# Patient Record
Sex: FEMALE | Race: WHITE | Employment: OTHER | URBAN - METROPOLITAN AREA
[De-identification: names, ages, dates, MRNs, and addresses within clinical notes are randomized per-mention and may not be internally consistent; named-entity substitution may affect disease eponyms.]

---

## 2017-03-27 DIAGNOSIS — N95.0 POSTMENOPAUSAL BLEEDING: ICD-10-CM

## 2017-03-27 NOTE — TELEPHONE ENCOUNTER
medroxyPROGESTERone (PROVERA) 5 MG tablet  Last Written Prescription Date: 4/8/16  Last Fill Quantity: 90, # refills: 3  Last Office Visit with [facility], [facility] or [facility] prescribing provider: 4/8/16       BP Readings from Last 3 Encounters:   04/08/16 124/74   11/21/14 146/90   11/07/14 158/77     Date of last Breast Exam: 10/7/14

## 2017-03-30 RX ORDER — MEDROXYPROGESTERONE ACETATE 5 MG
5 TABLET ORAL DAILY
Qty: 90 TABLET | Refills: 0 | Status: SHIPPED | OUTPATIENT
Start: 2017-03-30 | End: 2017-05-19

## 2017-03-30 NOTE — TELEPHONE ENCOUNTER
Medication is being filled for 1 time refill only due to:  Due for annual physical in April.   Please contact patient to schedule this.   Renuka Almodovar RN  Triage Nurse

## 2017-03-30 NOTE — TELEPHONE ENCOUNTER
Spoke with Pt and she is aware that she has to schedule, she has a lot going on in April with kids and family and is hoping to schedule in may.  Aixa MOHAMUD, STAS,CHLOE

## 2017-04-18 DIAGNOSIS — N95.1 SYMPTOMATIC MENOPAUSAL OR FEMALE CLIMACTERIC STATES: ICD-10-CM

## 2017-04-18 NOTE — TELEPHONE ENCOUNTER
cloNIDine (CATAPRES) 0.1 MG tablet        Last Written Prescription Date: 04/08/16  Last Fill Quantity: 90, # refills: 3  Last Office Visit with [facility], [facility] or [facility] prescribing provider: 04/08/16       Potassium   Date Value Ref Range Status   04/08/2016 4.2 3.4 - 5.3 mmol/L Final     Creatinine   Date Value Ref Range Status   04/08/2016 0.83 0.52 - 1.04 mg/dL Final     BP Readings from Last 3 Encounters:   04/08/16 124/74   11/21/14 146/90   11/07/14 158/77

## 2017-04-21 RX ORDER — CLONIDINE HYDROCHLORIDE 0.1 MG/1
0.1 TABLET ORAL AT BEDTIME
Qty: 60 TABLET | Refills: 0 | Status: SHIPPED | OUTPATIENT
Start: 2017-04-21 | End: 2017-05-19

## 2017-04-21 NOTE — TELEPHONE ENCOUNTER
Routing refill request to provider for review/approval because:  Drug not on the [facility] refill protocol   Renuka Almodovar, RN  Triage Nurse

## 2017-04-24 NOTE — TELEPHONE ENCOUNTER
LVM on mobile phone for patient to callback. Pt due for annual px last was 4/8/2016.    Thanks  Jalil MOLINA  Team Coodinator

## 2017-05-07 DIAGNOSIS — I10 ESSENTIAL HYPERTENSION, BENIGN: ICD-10-CM

## 2017-05-07 NOTE — TELEPHONE ENCOUNTER
triamterene-hydrochlorothiazide (DYAZIDE) 37.5-25 MG per capsule      Last Written Prescription Date: 4/18/2016  Last Fill Quantity: 90, # refills: 3  Last Office Visit with [facility], [facility] or [facility] prescribing provider: 4/18/2016  Next 5 appointments (look out 90 days)     May 19, 2017  8:50 AM CDT   PHYSICAL with Shena Alejo MD   [facility]    [address] [place] [id number] [phone]                   Potassium   Date Value Ref Range Status   04/08/2016 4.2 3.4 - 5.3 mmol/L Final     Creatinine   Date Value Ref Range Status   04/08/2016 0.83 0.52 - 1.04 mg/dL Final     BP Readings from Last 3 Encounters:   04/08/16 124/74   11/21/14 146/90   11/07/14 158/77

## 2017-05-09 RX ORDER — TRIAMTERENE AND HYDROCHLOROTHIAZIDE 37.5; 25 MG/1; MG/1
1 CAPSULE ORAL EVERY MORNING
Qty: 30 CAPSULE | Refills: 0 | Status: SHIPPED | OUTPATIENT
Start: 2017-05-09 | End: 2017-05-19

## 2017-05-09 NOTE — TELEPHONE ENCOUNTER
Medication is being filled for 1 time refill only due to:  Patient needs to be seen because it has been more than one year since last visit.     Patient has appt 5/19 with pcp.    Sharmila Estrada RN

## 2017-05-19 ENCOUNTER — RADIANT APPOINTMENT (OUTPATIENT)
Dept: BONE DENSITY | Facility: CLINIC | Age: 67
End: 2017-05-19
Payer: COMMERCIAL

## 2017-05-19 ENCOUNTER — OFFICE VISIT (OUTPATIENT)
Dept: PEDIATRICS | Facility: CLINIC | Age: 67
End: 2017-05-19
Payer: COMMERCIAL

## 2017-05-19 VITALS
DIASTOLIC BLOOD PRESSURE: 90 MMHG | OXYGEN SATURATION: 99 % | TEMPERATURE: 97.4 F | SYSTOLIC BLOOD PRESSURE: 156 MMHG | BODY MASS INDEX: 32.96 KG/M2 | WEIGHT: 186 LBS | HEIGHT: 63 IN | HEART RATE: 74 BPM

## 2017-05-19 DIAGNOSIS — E78.5 HYPERLIPIDEMIA LDL GOAL <160: ICD-10-CM

## 2017-05-19 DIAGNOSIS — Z23 NEED FOR PROPHYLACTIC VACCINATION AGAINST STREPTOCOCCUS PNEUMONIAE (PNEUMOCOCCUS): ICD-10-CM

## 2017-05-19 DIAGNOSIS — I10 ESSENTIAL HYPERTENSION, BENIGN: ICD-10-CM

## 2017-05-19 DIAGNOSIS — Z12.31 VISIT FOR SCREENING MAMMOGRAM: ICD-10-CM

## 2017-05-19 DIAGNOSIS — Z78.0 ASYMPTOMATIC POSTMENOPAUSAL STATUS: ICD-10-CM

## 2017-05-19 DIAGNOSIS — R73.01 IMPAIRED FASTING GLUCOSE: ICD-10-CM

## 2017-05-19 DIAGNOSIS — N95.0 POSTMENOPAUSAL BLEEDING: ICD-10-CM

## 2017-05-19 DIAGNOSIS — Z12.11 SCREEN FOR COLON CANCER: ICD-10-CM

## 2017-05-19 DIAGNOSIS — N95.1 SYMPTOMATIC MENOPAUSAL OR FEMALE CLIMACTERIC STATES: ICD-10-CM

## 2017-05-19 DIAGNOSIS — Z00.01 ENCOUNTER FOR ROUTINE ADULT PHYSICAL EXAM WITH ABNORMAL FINDINGS: Primary | ICD-10-CM

## 2017-05-19 PROCEDURE — 36415 COLL VENOUS BLD VENIPUNCTURE: CPT | Performed by: INTERNAL MEDICINE

## 2017-05-19 PROCEDURE — G0402 INITIAL PREVENTIVE EXAM: HCPCS | Performed by: INTERNAL MEDICINE

## 2017-05-19 PROCEDURE — 77080 DXA BONE DENSITY AXIAL: CPT | Performed by: FAMILY MEDICINE

## 2017-05-19 PROCEDURE — 90670 PCV13 VACCINE IM: CPT | Performed by: INTERNAL MEDICINE

## 2017-05-19 PROCEDURE — G0009 ADMIN PNEUMOCOCCAL VACCINE: HCPCS | Performed by: INTERNAL MEDICINE

## 2017-05-19 PROCEDURE — 80048 BASIC METABOLIC PNL TOTAL CA: CPT | Performed by: INTERNAL MEDICINE

## 2017-05-19 PROCEDURE — 77063 BREAST TOMOSYNTHESIS BI: CPT | Mod: TC

## 2017-05-19 PROCEDURE — G0202 SCR MAMMO BI INCL CAD: HCPCS | Mod: TC

## 2017-05-19 PROCEDURE — 80061 LIPID PANEL: CPT | Performed by: INTERNAL MEDICINE

## 2017-05-19 RX ORDER — MEDROXYPROGESTERONE ACETATE 5 MG
5 TABLET ORAL DAILY
Qty: 90 TABLET | Refills: 0 | Status: SHIPPED | OUTPATIENT
Start: 2017-05-19 | End: 2017-11-10

## 2017-05-19 RX ORDER — TRIAMTERENE AND HYDROCHLOROTHIAZIDE 37.5; 25 MG/1; MG/1
1 CAPSULE ORAL EVERY MORNING
Qty: 30 CAPSULE | Refills: 0 | Status: SHIPPED | OUTPATIENT
Start: 2017-05-19 | End: 2017-05-19

## 2017-05-19 RX ORDER — TRIAMTERENE AND HYDROCHLOROTHIAZIDE 37.5; 25 MG/1; MG/1
1 CAPSULE ORAL EVERY MORNING
Qty: 90 CAPSULE | Refills: 0 | Status: SHIPPED | OUTPATIENT
Start: 2017-05-19 | End: 2017-10-11

## 2017-05-19 RX ORDER — CLONIDINE HYDROCHLORIDE 0.1 MG/1
0.1 TABLET ORAL AT BEDTIME
Qty: 60 TABLET | Refills: 0 | Status: SHIPPED | OUTPATIENT
Start: 2017-05-19 | End: 2017-05-19

## 2017-05-19 RX ORDER — CLONIDINE HYDROCHLORIDE 0.1 MG/1
0.1 TABLET ORAL AT BEDTIME
Qty: 90 TABLET | Refills: 0 | Status: SHIPPED | OUTPATIENT
Start: 2017-05-19 | End: 2017-11-10

## 2017-05-19 NOTE — PROGRESS NOTES
SUBJECTIVE:                                                            Daria Driscoll is a 67 year old female who presents for Preventive Visit.  Are you in the first 12 months of your Medicare coverage?  Yes,  Visual Acuity:  Tested 1.5 wks ago at eye doctor and was 20/20    Physical   Annual:     Getting at least 3 servings of Calcium per day::  Yes    Bi-annual eye exam::  Yes    Dental care twice a year::  Yes    Sleep apnea or symptoms of sleep apnea::  None    Diet::  Regular (no restrictions)    Frequency of exercise::  2-3 days/week    Duration of exercise::  15-30 minutes    Taking medications regularly::  Yes    Medication side effects::  None    Additional concerns today::  No      COGNITIVE SCREEN  1) Repeat 3 items (Banana, Sunrise, Chair)    2) Clock draw: NORMAL  3) 3 item recall: Recalls 2 objects   Results: NORMAL clock, 1-2 items recalled: COGNITIVE IMPAIRMENT LESS LIKELY    Mini-CogTM Copyright CLAUDIA Carter. Licensed by the author for use in [facility]; reprinted with permission ([email or web address]@[email or web address]). All rights reserved.        Reviewed and updated as needed this visit by clinical staff  Tobacco  Allergies  Meds  Problems  Med Hx  Surg Hx  Fam Hx  Soc Hx          Reviewed and updated as needed this visit by Provider  Allergies  Meds  Problems        Social History   Substance Use Topics     Smoking status: Never Smoker     Smokeless tobacco: Never Used     Alcohol use No       The patient does not drink >3 drinks per day nor >7 drinks per week.        Today's PHQ-2 Score:   PHQ-2 ( 1999 Pfizer) 5/19/2017   Q1: Little interest or pleasure in doing things 0   Q2: Feeling down, depressed or hopeless 0   PHQ-2 Score 0   Little interest or pleasure in doing things Not at all   Feeling down, depressed or hopeless Not at all   PHQ-2 Score 0       Do you feel safe in your environment - Yes    Do you have a Health Care Directive?: Yes: Patient states has Advance Directive and will bring in a  "copy to clinic.  Kt, youngest son is  and would be decision maker.  Wants to be full code.  Do not keep alive if will be brain dead.  If chance of recovery - wants to be able to have some form of communication.    Current providers sharing in care for this patient include:   Patient Care Team:  Shena Alejo MD as PCP - General      Hearing impairment: Yes, Feel that people are mumbling or not speaking clearly occasionally.    Ability to successfully perform activities of daily living: Yes, no assistance needed     Fall risk:  Fallen 2 or more times in the past year?: No  Any fall with injury in the past year?: No    Home safety:  none identified      The following health maintenance items are reviewed in Epic and correct as of today:  Health Maintenance   Topic Date Due     FIT Q1 YR (NO INBASKET)  10/01/2015     MAMMO Q2 YR NO INBASKET MESSAGE  10/07/2016     FALL RISK ASSESSMENT  03/02/2017     DEXA SCAN SCREENING (SYSTEM ASSIGNED)  03/02/2017     PNEUMOCOCCAL (1 of 2 - PCV13) 03/02/2017     BMP Q1 YR (NO INBASKET)  04/08/2017     LIPID MONITORING Q1 YEAR( NO INBASKET)  04/08/2017     INFLUENZA VACCINE (SYSTEM ASSIGNED)  09/01/2017     PAP Q5 YEARS  10/07/2019     HPV Q5 YEARS (Complete with PAP)  10/07/2019     ADVANCE DIRECTIVE PLANNING Q5 YRS (NO INBASKET)  05/19/2022     TETANUS Q10 YR  02/12/2026     HEPATITIS C SCREENING  Completed          ROS:  Constitutional, HEENT, cardiovascular, pulmonary, GI, , musculoskeletal, neuro, skin, endocrine and psych systems are negative, except as otherwise noted.    Problem list, Medication list, Allergies, and Medical/Social/Surgical histories reviewed in [facility] and updated as appropriate.  Labs reviewed in EPIC  OBJECTIVE:                                                            /90 (BP Location: Right arm, Cuff Size: Adult Large)  Pulse 74  Temp 97.4  F (36.3  C) (Oral)  Ht 5' 2.5\" (1.588 m)  Wt 186 lb (84.4 kg)  SpO2 99%  BMI 33.48 " "kg/m2 Estimated body mass index is 33.48 kg/(m^2) as calculated from the following:    Height as of this encounter: 5' 2.5\" (1.588 m).    Weight as of this encounter: 186 lb (84.4 kg).  EXAM:   GENERAL: healthy, alert and no distress  EYES: Eyes grossly normal to inspection, PERRL and conjunctivae and sclerae normal  HENT: ear canals and TM's normal, nose and mouth without ulcers or lesions  NECK: no adenopathy, no asymmetry, masses, or scars and thyroid normal to palpation  RESP: lungs clear to auscultation - no rales, rhonchi or wheezes  BREAST: normal without masses, tenderness or nipple discharge and no palpable axillary masses or adenopathy  CV: regular rate and rhythm, normal S1 S2, no S3 or S4, no murmur, click or rub, no peripheral edema and peripheral pulses strong  ABDOMEN: soft, nontender, no hepatosplenomegaly, no masses and bowel sounds normal  MS: no gross musculoskeletal defects noted, no edema  SKIN: no suspicious lesions or rashes  NEURO: Normal strength and tone, mentation intact and speech normal  PSYCH: mentation appears normal, affect normal/bright    ASSESSMENT / PLAN:                                                            1. Encounter for routine adult physical exam with abnormal findings  Routine health education discussed: calcium, diet, exercise, weight, safety.     2. Impaired fasting glucose  Discussed glucose elevation.  Discuss this is a pre-diabetic condition.  Recommended eating healthily, exercising and maintaining a healthy weight to prevent the development of diabetes.  Recommended blood sugar checks at least yearly to monitor this.  Recommended dietary consultation which the patient declined.     3. Essential hypertension, benign  Uncontrolled.  Discussed healthy lifestyle  - BASIC METABOLIC PANEL  - triamterene-hydrochlorothiazide (DYAZIDE) 37.5-25 MG per capsule; Take 1 capsule by mouth every morning  Dispense: 90 capsule; Refill: 0    4. Hyperlipidemia LDL goal " "<160  recheck  - Lipid panel reflex to direct LDL    5. Symptomatic menopausal or female climacteric states  Controlld, refill  - cloNIDine (CATAPRES) 0.1 MG tablet; Take 1 tablet (0.1 mg) by mouth At Bedtime  Dispense: 90 tablet; Refill: 0    6. BENIGN HYPERTENSION  Uncontrolled, refill medication and consider adjustment if not better in 3 mos of lifestyle mod  - BASIC METABOLIC PANEL  - triamterene-hydrochlorothiazide (DYAZIDE) 37.5-25 MG per capsule; Take 1 capsule by mouth every morning  Dispense: 90 capsule; Refill: 0    7. Postmenopausal bleeding  Refill and will check with Dr. Gaxiola if needs to continue additional provera  - estradiol-norethindrone (COMBIPATCH) 0.05-0.14 MG/DAY BIW patch; Place 1 patch onto the skin twice a week  Dispense: 16 patch; Refill: 3  - medroxyPROGESTERone (PROVERA) 5 MG tablet; Take 1 tablet (5 mg) by mouth daily  Dispense: 90 tablet; Refill: 0    8. Need for prophylactic vaccination against Streptococcus pneumoniae (pneumococcus)    - Pneumococcal vaccine 13 valent PCV13 IM (Prevnar) [55763]    9. Asymptomatic postmenopausal status    - DEXA HIP/PELVIS/SPINE - Future; Future    10. Screen for colon cancer    - Fecal colorectal cancer screen FIT - Future (S+30); Future    11. Visit for screening mammogram  mammo      End of Life Planning:  Patient currently has an advanced directive: Yes.  Practitioner is supportive of decision.    COUNSELING:  Reviewed preventive health counseling, as reflected in patient instructions        Estimated body mass index is 33.48 kg/(m^2) as calculated from the following:    Height as of this encounter: 5' 2.5\" (1.588 m).    Weight as of this encounter: 186 lb (84.4 kg).  Weight management plan: Discussed healthy diet and exercise guidelines and patient will follow up in 12 months in clinic to re-evaluate.   reports that she has never smoked. She has never used smokeless tobacco.      Appropriate preventive services were discussed with this " patient, including applicable screening as appropriate for cardiovascular disease, diabetes, osteopenia/osteoporosis, and glaucoma.  As appropriate for age/gender, discussed screening for colorectal cancer, prostate cancer, breast cancer, and cervical cancer. Checklist reviewing preventive services available has been given to the patient.    Reviewed patients plan of care and provided an AVS. The Basic Care Plan (routine screening as documented in Health Maintenance) for Daria meets the Care Plan requirement. This Care Plan has been established and reviewed with the Patient.    Counseling Resources:  ATP IV Guidelines  Pooled Cohorts Equation Calculator  Breast Cancer Risk Calculator  FRAX Risk Assessment  ICSI Preventive Guidelines  Dietary Guidelines for Americans, 2010  USDA's MyPlate  ASA Prophylaxis  Lung CA Screening    Shena Alejo MD  [facility]

## 2017-05-19 NOTE — NURSING NOTE
"Chief Complaint   Patient presents with     Medicare Visit       Initial /90 (BP Location: Right arm, Cuff Size: Adult Large)  Pulse 74  Temp 97.4  F (36.3  C) (Oral)  Ht 5' 2.5\" (1.588 m)  Wt 186 lb (84.4 kg)  SpO2 99%  BMI 33.48 kg/m2 Estimated body mass index is 33.48 kg/(m^2) as calculated from the following:    Height as of this encounter: 5' 2.5\" (1.588 m).    Weight as of this encounter: 186 lb (84.4 kg).  Medication Reconciliation: complete   Cuca Earl MA    "

## 2017-05-19 NOTE — MR AVS SNAPSHOT
After Visit Summary   5/19/2017    Daria Driscoll    MRN: [id number]           Patient Information     Date Of Birth          1950        Visit Information        Provider Department      5/19/2017 8:50 AM Shena Alejo MD [facility]        Today's Diagnoses     Encounter for routine adult physical exam with abnormal findings    -  1    Impaired fasting glucose        Essential hypertension, benign        Hyperlipidemia LDL goal <160        Screen for colon cancer        Asymptomatic postmenopausal status        Visit for screening mammogram        Need for prophylactic vaccination against Streptococcus pneumoniae (pneumococcus)        Symptomatic menopausal or female climacteric states        BENIGN HYPERTENSION        Postmenopausal bleeding          Care Instructions      Preventive Health Recommendations    Female Ages 65 +    Yearly exam:     See your health care provider every year in order to  o Review health changes.   o Discuss preventive care.    o Review your medicines if your doctor has prescribed any.      You no longer need a yearly Pap test unless you've had an abnormal Pap test in the past 10 years. If you have vaginal symptoms, such as bleeding or discharge, be sure to talk with your provider about a Pap test.      Every 1 to 2 years, have a mammogram.  If you are over 69, talk with your health care provider about whether or not you want to continue having screening mammograms.      Every 10 years, have a colonoscopy. Or, have a yearly FIT test (stool test). These exams will check for colon cancer.       Have a cholesterol test every 5 years, or more often if your doctor advises it.       Have a diabetes test (fasting glucose) every three years. If you are at risk for diabetes, you should have this test more often.       At age 65, have a bone density scan (DEXA) to check for osteoporosis (brittle bone disease).    Shots:    Get a flu shot each year.    Get a tetanus  shot every 10 years.    Talk to your doctor about your pneumonia vaccines. There are now two you should receive - Pneumovax (PPSV 23) and Prevnar (PCV 13).    Talk to your doctor about the shingles vaccine.    Talk to your doctor about the hepatitis B vaccine.    Nutrition:     Eat at least 5 servings of fruits and vegetables each day.      Eat whole-grain bread, whole-wheat pasta and brown rice instead of white grains and rice.      Talk to your provider about Calcium and Vitamin D.     Lifestyle    Exercise at least 150 minutes a week (30 minutes a day, 5 days a week). This will help you control your weight and prevent disease.      Limit alcohol to one drink per day.      No smoking.       Wear sunscreen to prevent skin cancer.       See your dentist twice a year for an exam and cleaning.      See your eye doctor every 1 to 2 years to screen for conditions such as glaucoma, macular degeneration and cataracts.    Please mail in a copy of your living will for us to scan in and keep on file    Work on watching your diet and increasing your exercise and have your BP rechecked in 3 mos and let me know.  Eating Heart-Healthy Food: Using the DASH Plan    Eating for your heart doesn t have to be hard or boring. You just need to know how to make healthier choices. The DASH eating plan has been developed to help you do just that. DASH stands for Dietary Approaches to Stop Hypertension. It is a plan that has been proven to be healthier for your heart and to lower your risk for high blood pressure. It can also help lower your risk for cancer, heart disease, osteoporosis, and diabetes.  Choosing from each food group  Choose foods from each of the food groups below each day. Try to get the recommended number of servings for each food group. The serving numbers are based on a diet of 2,000 calories a day. Talk to your doctor if you re unsure about your calorie needs. Along with getting the correct servings, the DASH plan also  recommends a sodium intake less than 2,300 mg per day.        Grains  Servings: 6-8 a day  A serving is:    1 slice bread    1 ounce dry cereal    Half a cup cooked rice, pasta or cereal  Best choices: Whole grains and any grains high in fiber. Vegetables  Servings: 4-5 a day  A serving is:    1 cup raw leafy vegetable    Half a cup cut-up raw or cooked vegetable    Half a cup vegetable juice  Best choices: Fresh or frozen vegetables prepared without added salt or fat.   Fruits  Servings: 4-5 a day  A serving is:    1 medium fruit    One-quarter cup dried fruit    Half a cup fresh, frozen, or canned fruit    Half a cup of 100% fruit juics  Best choices: A variety of fresh fruits of different colors. Whole fruits are a better choice than fruit juices. Low-fat or fat-free dairy  Servings: 2-3 a day  A serving is:    1 cup milk    1 cup yogurt    One and a half ounces cheese  Best choices: Skim or 1% milk, low-fat or fat-free yogurt or buttermilk, and low-fat cheeses.         Lean meats, poultry, fish  Servings: 6 or fewer a day  A serving is:    1 ounce cooked meats, poultry, or fish    1 egg  Best choices: Lean poultry and fish. Trim away visible fat. Broil, grill, roast, or boil instead of frying. Remove skin from poultry before eating. Limit how much red meat you eat.  Nuts, seeds, beans  Servings: 4-5 a week  A serving is:    One-third cup nuts (one and a half ounces)    2 tablespoons nut butter or seeds    Half a cup cooked dry beans or legumes  Best choices:  Dry roasted  nuts with no salt added, lentils, kidney beans, garbanzo beans, and whole islas beans.   Fats and oils  Servings: 2-3 a day  A serving is:    1 teaspoon vegetable oil    1 teaspoon soft margarine    1 tablespoon mayonnaise    2 tablespoons salad dressing  Best choices: Nut and vegetable oils (nontropical vegetable oils), such as olive and canola oil. Sweets  Servings: 5 a week or fewer  A serving is:    1 tablespoon sugar, maple syrup, or  "honey    1 tablespoon jam or jelly    1 half-ounce jelly beans (about 15)    1 cup lemonade  Best choices: Dried fruit can be a satisfying sweet. Choose low-fat sweets. And watch your serving sizes!      For more on the DASH eating plan, visit:  www.nhlbi.nih.gov/health/health-topics/topics/dash     7687-2986 [employer]. [address] [place] [address], [place] [address]. All rights reserved. This information is not intended as a substitute for professional medical care. Always follow your healthcare professional's instructions.              Follow-ups after your visit        Future tests that were ordered for you today     Open Future Orders        Priority Expected Expires Ordered    DEXA HIP/PELVIS/SPINE - Future Routine  5/19/2018 5/19/2017    MA SCREENING DIGITAL BILAT - Future  (s+30) Routine  5/19/2018 5/19/2017    Fecal colorectal cancer screen FIT - Future (S+30) Routine 6/9/2017 6/18/2017 5/19/2017            Who to contact     If you have questions or need follow up information about today's clinic visit or your schedule please contact [facility] directly at [phone].  Normal or non-critical lab and imaging results will be communicated to you by MyChart, letter or phone within 4 business days after the clinic has received the results. If you do not hear from us within 7 days, please contact the clinic through [email or web address] or phone. If you have a critical or abnormal lab result, we will notify you by phone as soon as possible.  Submit refill requests through [employer] or call your pharmacy and they will forward the refill request to us. Please allow 3 business days for your refill to be completed.          Additional Information About Your Visit        MyChart Information     [employer]in lets you send messages to your doctor, view your test results, renew your prescriptions, schedule appointments and more. To sign up, go to [email or web address] . Click on \"Log in\" on the left side of the " "screen, which will take you to the Welcome page. Then click on \"Sign up Now\" on the right side of the page.     You will be asked to enter the access code listed below, as well as some personal information. Please follow the directions to create your username and password.     Your access code is: [email or web address]  Expires: 2017  9:31 AM     Your access code will  in 90 days. If you need help or a new code, please call your [place] clinic or [phone].        Care EveryWhere ID     This is your Care EveryWhere ID. This could be used by other organizations to access your [place] medical records  [id number]        Your Vitals Were     Pulse Temperature Height Pulse Oximetry BMI (Body Mass Index)       74 97.4  F (36.3  C) (Oral) 5' 2.5\" (1.588 m) 99% 33.48 kg/m2        Blood Pressure from Last 3 Encounters:   17 156/90   16 124/74   14 146/90    Weight from Last 3 Encounters:   17 186 lb (84.4 kg)   16 184 lb (83.5 kg)   14 194 lb 9.6 oz (88.3 kg)              We Performed the Following     BASIC METABOLIC PANEL     Full Code     Lipid panel reflex to direct LDL     Pneumococcal vaccine 13 valent PCV13 IM (Prevnar) [74999]          Today's Medication Changes          These changes are accurate as of: 17  9:35 AM.  If you have any questions, ask your nurse or doctor.               Start taking these medicines.        Dose/Directions    cloNIDine 0.1 MG tablet   Commonly known as:  CATAPRES   Used for:  Symptomatic menopausal or female climacteric states   Started by:  Shena Alejo MD        Dose:  0.1 mg   Take 1 tablet (0.1 mg) by mouth At Bedtime   Quantity:  90 tablet   Refills:  0       triamterene-hydrochlorothiazide 37.5-25 MG per capsule   Commonly known as:  DYAZIDE   Used for:  Essential hypertension, benign   Started by:  Shena Alejo MD        Dose:  1 capsule   Take 1 capsule by mouth every morning   Quantity:  90 capsule   Refills:  0          "   Where to get your medicines      These medications were sent to [facility]/pharmacy #[id number] - [place], [place] - [address] [facility]  [address] [facility] [place] [address]     Phone:  [phone]     cloNIDine 0.1 MG tablet    estradiol-norethindrone 0.05-0.14 MG/DAY BIW patch    medroxyPROGESTERone 5 MG tablet    triamterene-hydrochlorothiazide 37.5-25 MG per capsule                Primary Care Provider Office Phone # Fax #    Shena Alejo [phone]       [facility] [address] [place] [address]        Thank you!     Thank you for choosing [facility]  for your care. Our goal is always to provide you with excellent care. Hearing back from our patients is one way we can continue to improve our services. Please take a few minutes to complete the written survey that you may receive in the mail after your visit with us. Thank you!             Your Updated Medication List - Protect others around you: Learn how to safely use, store and throw away your medicines at www.disposemymeds.org.          This list is accurate as of: 5/19/17  9:35 AM.  Always use your most recent med list.                   Brand Name Dispense Instructions for use    aspirin  MG EC tablet     90 tablet    Take 325 mg by mouth daily Stopped 10 days prior to surg       calcium carb 1250 mg (500 mg [place])/vitamin D 200 units 500-200 MG-UNIT per tablet    OSCAL with D    100 tablet    Take 1 tablet by mouth 2 times daily (with meals).       cloNIDine 0.1 MG tablet    CATAPRES    90 tablet    Take 1 tablet (0.1 mg) by mouth At Bedtime       estradiol-norethindrone 0.05-0.14 MG/DAY BIW patch   Start taking on:  5/22/2017    COMBIPATCH    16 patch    Place 1 patch onto the skin twice a week       medroxyPROGESTERone 5 MG tablet    PROVERA    90 tablet    Take 1 tablet (5 mg) by mouth daily       multivitamin per tablet     100 tablet    Take 1 tablet by mouth daily.       red yeast rice 600 MG  Caps      Take 1,200 mg by mouth daily.       triamterene-hydrochlorothiazide 37.5-25 MG per capsule    DYAZIDE    90 capsule    Take 1 capsule by mouth every morning

## 2017-05-19 NOTE — LETTER
[address] [address]                  [phone]   May 22, 2017    Daria Driscoll  [address]  [facility] [address]      Dear Daria,    Here is a summary of your recent test results:    Your electrolytes, kidney function and blood sugar are normal.     Your 10-year ASCVD risk score (risk of heart attack or stroke in the next 10 years) is: 12.4%.  The goal is <10% and <7.5% is preferred.  Your risk is being driven by your high BP and your cholesterol.  Getting the top number of your blood pressure down to 130 will drop your risk to 8.8%.  Getting it to 120 will drop it to 7.5%.  I recall that you want to avoid cholesterol lowering medication, so I think this is your best option.  Please let me know how your BP is doing and we can adjust your medication.  Alternatively, you could control your risk by starting cholesterol lowering medication.  Let me know if you want to try that.     Please let us know if you have any questions or concerns.    Best regards,    Shena Alejo MD        Results for orders placed or performed in visit on 05/19/17   BASIC METABOLIC PANEL   Result Value Ref Range    Sodium 142 133 - 144 mmol/L    Potassium 3.8 3.4 - 5.3 mmol/L    Chloride 110 (H) 94 - 109 mmol/L    Carbon Dioxide 22 20 - 32 mmol/L    Anion Gap 10 3 - 14 mmol/L    Glucose 97 70 - 99 mg/dL    Urea Nitrogen 16 7 - 30 mg/dL    Creatinine 0.78 0.52 - 1.04 mg/dL    GFR Estimate 74 >60 mL/min/1.7m2    GFR Estimate If Black 89 >60 mL/min/1.7m2    Calcium 8.7 8.5 - 10.1 mg/dL   Lipid panel reflex to direct LDL   Result Value Ref Range    Cholesterol 204 (H) <200 mg/dL    Triglycerides 102 <150 mg/dL    HDL Cholesterol 41 (L) >49 mg/dL    LDL Cholesterol Calculated 143 (H) <100 mg/dL    Non HDL Cholesterol 163 (H) <130 mg/dL

## 2017-05-19 NOTE — PATIENT INSTRUCTIONS
Preventive Health Recommendations    Female Ages 65 +    Yearly exam:     See your health care provider every year in order to  o Review health changes.   o Discuss preventive care.    o Review your medicines if your doctor has prescribed any.      You no longer need a yearly Pap test unless you've had an abnormal Pap test in the past 10 years. If you have vaginal symptoms, such as bleeding or discharge, be sure to talk with your provider about a Pap test.      Every 1 to 2 years, have a mammogram.  If you are over 69, talk with your health care provider about whether or not you want to continue having screening mammograms.      Every 10 years, have a colonoscopy. Or, have a yearly FIT test (stool test). These exams will check for colon cancer.       Have a cholesterol test every 5 years, or more often if your doctor advises it.       Have a diabetes test (fasting glucose) every three years. If you are at risk for diabetes, you should have this test more often.       At age 65, have a bone density scan (DEXA) to check for osteoporosis (brittle bone disease).    Shots:    Get a flu shot each year.    Get a tetanus shot every 10 years.    Talk to your doctor about your pneumonia vaccines. There are now two you should receive - Pneumovax (PPSV 23) and Prevnar (PCV 13).    Talk to your doctor about the shingles vaccine.    Talk to your doctor about the hepatitis B vaccine.    Nutrition:     Eat at least 5 servings of fruits and vegetables each day.      Eat whole-grain bread, whole-wheat pasta and brown rice instead of white grains and rice.      Talk to your provider about Calcium and Vitamin D.     Lifestyle    Exercise at least 150 minutes a week (30 minutes a day, 5 days a week). This will help you control your weight and prevent disease.      Limit alcohol to one drink per day.      No smoking.       Wear sunscreen to prevent skin cancer.       See your dentist twice a year for an exam and cleaning.      See your  eye doctor every 1 to 2 years to screen for conditions such as glaucoma, macular degeneration and cataracts.    Please mail in a copy of your living will for us to scan in and keep on file    Work on watching your diet and increasing your exercise and have your BP rechecked in 3 mos and let me know.  Eating Heart-Healthy Food: Using the DASH Plan    Eating for your heart doesn t have to be hard or boring. You just need to know how to make healthier choices. The DASH eating plan has been developed to help you do just that. DASH stands for Dietary Approaches to Stop Hypertension. It is a plan that has been proven to be healthier for your heart and to lower your risk for high blood pressure. It can also help lower your risk for cancer, heart disease, osteoporosis, and diabetes.  Choosing from each food group  Choose foods from each of the food groups below each day. Try to get the recommended number of servings for each food group. The serving numbers are based on a diet of 2,000 calories a day. Talk to your doctor if you re unsure about your calorie needs. Along with getting the correct servings, the DASH plan also recommends a sodium intake less than 2,300 mg per day.        Grains  Servings: 6-8 a day  A serving is:    1 slice bread    1 ounce dry cereal    Half a cup cooked rice, pasta or cereal  Best choices: Whole grains and any grains high in fiber. Vegetables  Servings: 4-5 a day  A serving is:    1 cup raw leafy vegetable    Half a cup cut-up raw or cooked vegetable    Half a cup vegetable juice  Best choices: Fresh or frozen vegetables prepared without added salt or fat.   Fruits  Servings: 4-5 a day  A serving is:    1 medium fruit    One-quarter cup dried fruit    Half a cup fresh, frozen, or canned fruit    Half a cup of 100% fruit juics  Best choices: A variety of fresh fruits of different colors. Whole fruits are a better choice than fruit juices. Low-fat or fat-free dairy  Servings: 2-3 a day  A serving  is:    1 cup milk    1 cup yogurt    One and a half ounces cheese  Best choices: Skim or 1% milk, low-fat or fat-free yogurt or buttermilk, and low-fat cheeses.         Lean meats, poultry, fish  Servings: 6 or fewer a day  A serving is:    1 ounce cooked meats, poultry, or fish    1 egg  Best choices: Lean poultry and fish. Trim away visible fat. Broil, grill, roast, or boil instead of frying. Remove skin from poultry before eating. Limit how much red meat you eat.  Nuts, seeds, beans  Servings: 4-5 a week  A serving is:    One-third cup nuts (one and a half ounces)    2 tablespoons nut butter or seeds    Half a cup cooked dry beans or legumes  Best choices:  Dry roasted  nuts with no salt added, lentils, kidney beans, garbanzo beans, and whole islas beans.   Fats and oils  Servings: 2-3 a day  A serving is:    1 teaspoon vegetable oil    1 teaspoon soft margarine    1 tablespoon mayonnaise    2 tablespoons salad dressing  Best choices: Nut and vegetable oils (nontropical vegetable oils), such as olive and canola oil. Sweets  Servings: 5 a week or fewer  A serving is:    1 tablespoon sugar, maple syrup, or honey    1 tablespoon jam or jelly    1 half-ounce jelly beans (about 15)    1 cup lemonade  Best choices: Dried fruit can be a satisfying sweet. Choose low-fat sweets. And watch your serving sizes!      For more on the DASH eating plan, visit:  www.nhlbi.nih.gov/health/health-topics/topics/dash     2235-2167 [employer]. [address] [place] [address], [place] [address]. All rights reserved. This information is not intended as a substitute for professional medical care. Always follow your healthcare professional's instructions.

## 2017-05-19 NOTE — NURSING NOTE
"Chief Complaint   Patient presents with     Medicare Visit       Initial /90 (BP Location: Right arm, Cuff Size: Adult Large)  Pulse 74  Temp 97.4  F (36.3  C) (Oral)  Ht 5' 2.5\" (1.588 m)  Wt 186 lb (84.4 kg)  SpO2 99%  BMI 33.48 kg/m2 Estimated body mass index is 33.48 kg/(m^2) as calculated from the following:    Height as of this encounter: 5' 2.5\" (1.588 m).    Weight as of this encounter: 186 lb (84.4 kg).  Medication Reconciliation: complete   Screening Questionnaire for Adult Immunization    Are you sick today?   No   Do you have allergies to medications, food, a vaccine component or latex?   No   Have you ever had a serious reaction after receiving a vaccination?   No   Do you have a long-term health problem with heart disease, lung disease, asthma, kidney disease, metabolic disease (e.g. diabetes), anemia, or other blood disorder?   No   Do you have cancer, leukemia, HIV/AIDS, or any other immune system problem?   No   In the past 3 months, have you taken medications that affect  your immune system, such as prednisone, other steroids, or anticancer drugs; drugs for the treatment of rheumatoid arthritis, Crohn s disease, or psoriasis; or have you had radiation treatments?   No   Have you had a seizure, or a brain or other nervous system problem?   No   During the past year, have you received a transfusion of blood or blood     products, or been given immune (gamma) globulin or antiviral drug?   No   For women: Are you pregnant or is there a chance you could become        pregnant during the next month?   No   Have you received any vaccinations in the past 4 weeks?   No     Immunization questionnaire answers were all negative.      MNVFC doesn't apply on this patient    Per orders of Dr. Alejo, injection of PCV 13 given by Cuca Earl. Patient instructed to remain in clinic for 20 minutes afterwards, and to report any adverse reaction to me immediately.       Screening performed by Cuca HOLT" Mady on 5/19/2017 at 9:52 AM.    Cuca Earl MA

## 2017-05-20 LAB
ANION GAP SERPL CALCULATED.3IONS-SCNC: 10 MMOL/L (ref 3–14)
BUN SERPL-MCNC: 16 MG/DL (ref 7–30)
CALCIUM SERPL-MCNC: 8.7 MG/DL (ref 8.5–10.1)
CHLORIDE SERPL-SCNC: 110 MMOL/L (ref 94–109)
CHOLEST SERPL-MCNC: 204 MG/DL
CO2 SERPL-SCNC: 22 MMOL/L (ref 20–32)
CREAT SERPL-MCNC: 0.78 MG/DL (ref 0.52–1.04)
GFR SERPL CREATININE-BSD FRML MDRD: 74 ML/MIN/1.7M2
GLUCOSE SERPL-MCNC: 97 MG/DL (ref 70–99)
HDLC SERPL-MCNC: 41 MG/DL
LDLC SERPL CALC-MCNC: 143 MG/DL
NONHDLC SERPL-MCNC: 163 MG/DL
POTASSIUM SERPL-SCNC: 3.8 MMOL/L (ref 3.4–5.3)
SODIUM SERPL-SCNC: 142 MMOL/L (ref 133–144)
TRIGL SERPL-MCNC: 102 MG/DL

## 2017-05-21 ENCOUNTER — TELEPHONE (OUTPATIENT)
Dept: PEDIATRICS | Facility: CLINIC | Age: 67
End: 2017-05-21

## 2017-05-21 NOTE — TELEPHONE ENCOUNTER
Please call pt - Dr. Gaxiola said she could stop provera and just use patch.  She needs to notify us if vaginal bleeding develops.  Also remind her to be cheking BP ewekly and notify us of progress so can adjust medication if not controlled in 3 mos.

## 2017-05-22 NOTE — TELEPHONE ENCOUNTER
Called and spoke with patient. She had questions about stopping the provera and her hot flashes. She stated that if she ever missed a provera pill her hot flashes would come back and they get pretty intense.     She didn't seem to sure about stopping the provera without knowing how to manage the hot flashes.     Please advise.     Alexandra Rudd CMA ([facility])

## 2017-05-26 ENCOUNTER — TELEPHONE (OUTPATIENT)
Dept: NURSING | Facility: CLINIC | Age: 67
End: 2017-05-26

## 2017-05-26 NOTE — TELEPHONE ENCOUNTER
"Got MD's voice mail. States she'll stop the Provera. She wants to thank you for calling. She was getting mail from the mail box. She doesn't have questions at this time. She will call back if hot flashes worsen or if has complications. She thanks you for calling, \"it was very nice.\"  Candace Rodriguez RN-[facility]    "

## 2017-05-26 NOTE — TELEPHONE ENCOUNTER
Left message on cell.  provera doesn't help with hot flashes.try stopping and can always resume if has symptoms.

## 2017-06-07 DIAGNOSIS — I10 ESSENTIAL HYPERTENSION, BENIGN: ICD-10-CM

## 2017-06-07 RX ORDER — TRIAMTERENE AND HYDROCHLOROTHIAZIDE 37.5; 25 MG/1; MG/1
1 CAPSULE ORAL EVERY MORNING
Qty: 90 CAPSULE | Refills: 0 | Status: CANCELLED | OUTPATIENT
Start: 2017-06-07

## 2017-06-08 NOTE — TELEPHONE ENCOUNTER
triamterene-hydrochlorothiazide (DYAZIDE) 37.5-25 MG per capsule      Last Written Prescription Date: 5/19/2017  Last Fill Quantity: 90, # refills: 0  Last Office Visit with G, P or [facility] prescribing provider: 5/19/2017       Potassium   Date Value Ref Range Status   05/19/2017 3.8 3.4 - 5.3 mmol/L Final     Creatinine   Date Value Ref Range Status   05/19/2017 0.78 0.52 - 1.04 mg/dL Final     BP Readings from Last 3 Encounters:   05/19/17 156/90   04/08/16 124/74   11/21/14 146/90

## 2017-06-13 NOTE — TELEPHONE ENCOUNTER
Patient seen for physical in may, this is too soon for a refill.   May need follow up appointment.  Renuka Almodovar, RN  Triage Nurse

## 2017-10-06 ENCOUNTER — TELEPHONE (OUTPATIENT)
Dept: PEDIATRICS | Facility: CLINIC | Age: 67
End: 2017-10-06

## 2017-10-06 NOTE — LETTER
December 12, 2017      Daria Driscoll  [address]  [facility] [address]        Dear Daria,       We care about your health and have reviewed your health plan including your medical conditions, medications, and lab results.  Based on this review, it is recommended that you follow up regarding the following health topic(s):  -Fit Test    We recommend you take the following action(s):  Please Complete Fit Test. Please disregard if already completed         Thank you for trusting [facility] and we appreciate the opportunity to serve you.  We look forward to supporting your healthcare needs in the future.    Healthy Regards,    Your Health Care Team  [facility]      Sincerely,          Shena Alejo MD

## 2017-10-06 NOTE — TELEPHONE ENCOUNTER
Panel Management Review      Patient has the following on her problem list:     Hypertension   Last three blood pressure readings:  BP Readings from Last 3 Encounters:   05/19/17 156/90   04/08/16 124/74   11/21/14 146/90     Blood pressure: FAILED    HTN Guidelines:  Age 18-59 BP range:  Less than 140/90  Age 60-85 with Diabetes:  Less than 140/90  Age 60-85 without Diabetes:  less than 150/90        Composite cancer screening  Chart review shows that this patient is due/due soon for the following Fecal Colorectal (FIT)  Summary:    Patient is due/failing the following:   BP CHECK and FIT    Action needed:   Patient needs nurse only appointment and fit test    Type of outreach:    Phone, left message for patient to call back.     Questions for provider review:    None                                                                                                                                    Cindy Lock MA       Chart routed to self .

## 2017-10-11 DIAGNOSIS — I10 ESSENTIAL HYPERTENSION, BENIGN: ICD-10-CM

## 2017-10-11 NOTE — TELEPHONE ENCOUNTER
triamterene-hydrochlorothiazide (DYAZIDE) 37.5-25 MG per capsule      Last Written Prescription Date: 5/19/2017  Last Fill Quantity: 90, # refills: 0  Last Office Visit with G, P or [facility] prescribing provider: 5/19/2017       Potassium   Date Value Ref Range Status   05/19/2017 3.8 3.4 - 5.3 mmol/L Final     Creatinine   Date Value Ref Range Status   05/19/2017 0.78 0.52 - 1.04 mg/dL Final     BP Readings from Last 3 Encounters:   05/19/17 156/90   04/08/16 124/74   11/21/14 146/90

## 2017-10-12 RX ORDER — TRIAMTERENE AND HYDROCHLOROTHIAZIDE 37.5; 25 MG/1; MG/1
CAPSULE ORAL
Qty: 30 CAPSULE | Refills: 1 | Status: SHIPPED | OUTPATIENT
Start: 2017-10-12 | End: 2017-11-10

## 2017-10-12 NOTE — TELEPHONE ENCOUNTER
Routing refill request to provider for review/approval because:  BP elevated, due for follow up per last refill.  Break in medication?  Renuka Almodovar, RN  Triage Nurse

## 2017-11-08 NOTE — TELEPHONE ENCOUNTER
Panel Management Review      Patient has the following on her problem list:     Hypertension   Last three blood pressure readings:  BP Readings from Last 3 Encounters:   05/19/17 156/90   04/08/16 124/74   11/21/14 146/90     Blood pressure: failed    HTN Guidelines:  Age 18-59 BP range:  Less than 140/90  Age 60-85 with Diabetes:  Less than 140/90  Age 60-85 without Diabetes:  less than 150/90        Composite cancer screening  Chart review shows that this patient is due/due soon for the following Fecal Colorectal (FIT)  Summary:    Patient is due/failing the following:   BP CHECK and FIT    Action needed:   Patient needs referral/order: fit test and bp check    Type of outreach:    Phone, spoke to patient.  pt will be in 11/10 and will take home supplies for fit test    Questions for provider review:    None                                                                                                                                    Cindy Lock MA       Chart routed to self .

## 2017-11-10 ENCOUNTER — TELEPHONE (OUTPATIENT)
Dept: PEDIATRICS | Facility: CLINIC | Age: 67
End: 2017-11-10

## 2017-11-10 ENCOUNTER — OFFICE VISIT (OUTPATIENT)
Dept: PEDIATRICS | Facility: CLINIC | Age: 67
End: 2017-11-10
Payer: COMMERCIAL

## 2017-11-10 VITALS
BODY MASS INDEX: 32.87 KG/M2 | OXYGEN SATURATION: 99 % | DIASTOLIC BLOOD PRESSURE: 78 MMHG | HEIGHT: 63 IN | HEART RATE: 72 BPM | TEMPERATURE: 97.6 F | WEIGHT: 185.5 LBS | SYSTOLIC BLOOD PRESSURE: 136 MMHG

## 2017-11-10 DIAGNOSIS — Z12.11 SCREEN FOR COLON CANCER: ICD-10-CM

## 2017-11-10 DIAGNOSIS — N95.1 SYMPTOMATIC MENOPAUSAL OR FEMALE CLIMACTERIC STATES: ICD-10-CM

## 2017-11-10 DIAGNOSIS — I10 ESSENTIAL HYPERTENSION, BENIGN: Primary | ICD-10-CM

## 2017-11-10 DIAGNOSIS — Z23 NEED FOR PROPHYLACTIC VACCINATION AND INOCULATION AGAINST INFLUENZA: ICD-10-CM

## 2017-11-10 PROCEDURE — 90471 IMMUNIZATION ADMIN: CPT | Performed by: INTERNAL MEDICINE

## 2017-11-10 PROCEDURE — 90662 IIV NO PRSV INCREASED AG IM: CPT | Performed by: INTERNAL MEDICINE

## 2017-11-10 PROCEDURE — 99213 OFFICE O/P EST LOW 20 MIN: CPT | Mod: 25 | Performed by: INTERNAL MEDICINE

## 2017-11-10 RX ORDER — CLONIDINE HYDROCHLORIDE 0.1 MG/1
0.1 TABLET ORAL AT BEDTIME
Qty: 90 TABLET | Refills: 3 | Status: SHIPPED | OUTPATIENT
Start: 2017-11-10 | End: 2018-11-14

## 2017-11-10 RX ORDER — TRIAMTERENE AND HYDROCHLOROTHIAZIDE 37.5; 25 MG/1; MG/1
CAPSULE ORAL
Qty: 90 CAPSULE | Refills: 3 | Status: SHIPPED | OUTPATIENT
Start: 2017-11-10 | End: 2018-11-14

## 2017-11-10 NOTE — TELEPHONE ENCOUNTER
IGNACIO NOVACEMARCELLO (Key: [id number]) - [id number]  Prempro 0.3-1.5MG tablets  Status: PA Request  Created: November 10th, 2017 [phone]  Sent: November 10th, 2017  Open  Archive    Awaiting response.    Cindy Lock MA

## 2017-11-10 NOTE — PROGRESS NOTES
"  SUBJECTIVE:   Daria Driscoll is a 67 year old female who presents to clinic today for the following health issues:      Hypertension Follow-up      Outpatient blood pressures once daily but not the last 2 wks.      Low Salt Diet: 1 gram sodium    Menopause - still getting hor flashes.  Insurance does not want to pay for combipatch.  Waking at night and lasting an hour (hard to get back to sleep) although less frequent.      Amount of exercise or physical activity: 2-3 days/week for an average of 30-45 minutes    Problems taking medications regularly: No    Medication side effects: none    Diet: low salt      Problem list and histories reviewed & adjusted, as indicated.  Additional history: as documented    Labs reviewed in EPIC    Reviewed and updated as needed this visit by clinical staffTobacco  Allergies  Meds  Problems  Med Hx  Surg Hx  Fam Hx  Soc Hx        Reviewed and updated as needed this visit by Provider  Allergies  Meds  Problems         ROS:  Constitutional, cardiovascular, pulmonary, gi and gu systems are negative, except as otherwise noted.      OBJECTIVE:   /78 (BP Location: Right arm, Patient Position: Chair, Cuff Size: Adult Regular)  Pulse 72  Temp 97.6  F (36.4  C) (Oral)  Ht 5' 2.5\" (1.588 m)  Wt 185 lb 8 oz (84.1 kg)  SpO2 99%  BMI 33.39 kg/m2  Body mass index is 33.39 kg/(m^2).  GENERAL: healthy, alert and no distress  PSYCH: mentation appears normal, affect normal/bright        ASSESSMENT/PLAN:       1. Essential hypertension, benign  Controlled, refilled.  Discussed diet and exercise  - cloNIDine (CATAPRES) 0.1 MG tablet; Take 1 tablet (0.1 mg) by mouth At Bedtime  Dispense: 90 tablet; Refill: 3  - triamterene-hydrochlorothiazide (DYAZIDE) 37.5-25 MG per capsule; TAKE 1 CAPSULE BY MOUTH EVERY MORNING  Dispense: 90 capsule; Refill: 3    2. Symptomatic menopausal or female climacteric states  Per formulary, will change to oral rather than patch.  Notify if symptoms not " controlled  - cloNIDine (CATAPRES) 0.1 MG tablet; Take 1 tablet (0.1 mg) by mouth At Bedtime  Dispense: 90 tablet; Refill: 3  - estrogen, conjugated,-medroxyPROGESTERone (PREMPRO) 0.3-1.5 MG per tablet; Take 1 tablet by mouth daily  Dispense: 90 tablet; Refill: 3    3. Screen for colon cancer    - Fecal colorectal cancer screen (FIT); Future    4. Need for prophylactic vaccination and inoculation against influenza    - Vaccine Administration, Initial [16884]  - FLU VACCINE, INCREASED ANTIGEN, PRESV FREE, AGE 65+ [21573]    See Patient Instructions    Shena Alejo MD  [facility]

## 2017-11-10 NOTE — MR AVS SNAPSHOT
"              After Visit Summary   11/10/2017    Daria Driscoll    MRN: [id number]           Patient Information     Date Of Birth          1950        Visit Information        Provider Department      11/10/2017 8:30 AM Shena Alejo MD [facility]        Today's Diagnoses     Essential hypertension, benign    -  1    Symptomatic menopausal or female climacteric states        Screen for colon cancer          Care Instructions    Continue to exercise regularly and eat healthily.    Complete the stool test for blood.          Follow-ups after your visit        Follow-up notes from your care team     Return in about 6 months (around 5/10/2018) for Physical Exam.      Future tests that were ordered for you today     Open Future Orders        Priority Expected Expires Ordered    Fecal colorectal cancer screen (FIT) Routine 12/1/2017 2/2/2018 11/10/2017            Who to contact     If you have questions or need follow up information about today's clinic visit or your schedule please contact [facility] directly at [phone].  Normal or non-critical lab and imaging results will be communicated to you by [employer], letter or phone within 4 business days after the clinic has received the results. If you do not hear from us within 7 days, please contact the clinic through [employer] or phone. If you have a critical or abnormal lab result, we will notify you by phone as soon as possible.  Submit refill requests through [employer] or call your pharmacy and they will forward the refill request to us. Please allow 3 business days for your refill to be completed.          Additional Information About Your Visit        MyChart Information     [employer] lets you send messages to your doctor, view your test results, renew your prescriptions, schedule appointments and more. To sign up, go to [email or web address] . Click on \"Log in\" on the left side of the screen, which will take you to the Welcome page. " "Then click on \"Sign up Now\" on the right side of the page.     You will be asked to enter the access code listed below, as well as some personal information. Please follow the directions to create your username and password.     Your access code is: [email or web address]  Expires: 2018  8:59 AM     Your access code will  in 90 days. If you need help or a new code, please call your [facility] clinic or [phone].        Care EveryWhere ID     This is your Care EveryWhere ID. This could be used by other organizations to access your [facility] medical records  [id number]        Your Vitals Were     Pulse Temperature Height Pulse Oximetry BMI (Body Mass Index)       72 97.6  F (36.4  C) (Oral) 5' 2.5\" (1.588 m) 99% 33.39 kg/m2        Blood Pressure from Last 3 Encounters:   11/10/17 136/78   17 156/90   16 124/74    Weight from Last 3 Encounters:   11/10/17 185 lb 8 oz (84.1 kg)   17 186 lb (84.4 kg)   16 184 lb (83.5 kg)                 Today's Medication Changes          These changes are accurate as of: 11/10/17  8:59 AM.  If you have any questions, ask your nurse or doctor.               Start taking these medicines.        Dose/Directions    estrogen (conjugated)-medroxyPROGESTERone 0.3-1.5 MG per tablet   Commonly known as:  PREMPRO   Used for:  Symptomatic menopausal or female climacteric states   Started by:  Shena Alejo MD        Dose:  1 tablet   Take 1 tablet by mouth daily   Quantity:  90 tablet   Refills:  3         These medicines have changed or have updated prescriptions.        Dose/Directions    triamterene-hydrochlorothiazide 37.5-25 MG per capsule   Commonly known as:  DYAZIDE   This may have changed:  See the new instructions.   Used for:  Essential hypertension, benign   Changed by:  Shena Alejo MD        TAKE 1 CAPSULE BY MOUTH EVERY MORNING   Quantity:  90 capsule   Refills:  3            Where to get your medicines      These medications were sent to [facility]/pharmacy " #[id number] - [place], [place] - [address] [facility]  [address] [facility] [place] [address]     Phone:  [phone]     cloNIDine 0.1 MG tablet    estrogen (conjugated)-medroxyPROGESTERone 0.3-1.5 MG per tablet    triamterene-hydrochlorothiazide 37.5-25 MG per capsule                Primary Care Provider Office Phone # Fax #    Shena Alejo [phone] [address] [place] [address]        Equal Access to Services     [facility]: Hadii aad ku hadasho Soomaali, waaxda luqadaha, qaybta kaalmada adeannieyacarlitos, radha miller . So [facility] [phone].    ATENCIÓN: Si habla español, tiene a crocker disposición servicios gratuitos de asistencia lingüística. [facility] [phone].    We comply with applicable federal civil rights laws and [place] laws. We do not discriminate on the basis of race, color, national origin, age, disability, sex, sexual orientation, or gender identity.            Thank you!     Thank you for choosing [facility]  for your care. Our goal is always to provide you with excellent care. Hearing back from our patients is one way we can continue to improve our services. Please take a few minutes to complete the written survey that you may receive in the mail after your visit with us. Thank you!             Your Updated Medication List - Protect others around you: Learn how to safely use, store and throw away your medicines at www.disposemymeds.org.          This list is accurate as of: 11/10/17  8:59 AM.  Always use your most recent med list.                   Brand Name Dispense Instructions for use Diagnosis    aspirin  MG EC tablet     90 tablet    Take 325 mg by mouth daily Stopped 10 days prior to surg        Calcium carb-Vitamin D 500 mg Hoh-200 units 500-200 MG-UNIT per tablet    OSCAL with D;Oyster Shell Calcium    100 tablet    Take 1 tablet by mouth 2 times daily (with meals).        cloNIDine 0.1 MG tablet     CATAPRES    90 tablet    Take 1 tablet (0.1 mg) by mouth At Bedtime    Symptomatic menopausal or female climacteric states, Essential hypertension, benign       estradiol-norethindrone 0.05-0.14 MG/DAY BIW patch    COMBIPATCH    16 patch    Place 1 patch onto the skin twice a week    Postmenopausal bleeding       estrogen (conjugated)-medroxyPROGESTERone 0.3-1.5 MG per tablet    PREMPRO    90 tablet    Take 1 tablet by mouth daily    Symptomatic menopausal or female climacteric states       multivitamin per tablet     100 tablet    Take 1 tablet by mouth daily.        red yeast rice 600 MG Caps      Take 1,200 mg by mouth daily.        triamterene-hydrochlorothiazide 37.5-25 MG per capsule    DYAZIDE    90 capsule    TAKE 1 CAPSULE BY MOUTH EVERY MORNING    Essential hypertension, benign

## 2017-11-10 NOTE — NURSING NOTE
"Chief Complaint   Patient presents with     Hypertension       Initial /78 (BP Location: Right arm, Patient Position: Chair, Cuff Size: Adult Regular)  Pulse 72  Temp 97.6  F (36.4  C) (Oral)  Ht 5' 2.5\" (1.588 m)  Wt 185 lb 8 oz (84.1 kg)  SpO2 99%  BMI 33.39 kg/m2 Estimated body mass index is 33.39 kg/(m^2) as calculated from the following:    Height as of this encounter: 5' 2.5\" (1.588 m).    Weight as of this encounter: 185 lb 8 oz (84.1 kg).  Medication Reconciliation: complete   Cindy Lock MA    "

## 2017-11-10 NOTE — PROGRESS NOTES

## 2017-11-14 ENCOUNTER — TELEPHONE (OUTPATIENT)
Dept: PEDIATRICS | Facility: CLINIC | Age: 67
End: 2017-11-14

## 2017-11-14 PROBLEM — Z71.89 ACP (ADVANCE CARE PLANNING): Chronic | Status: ACTIVE | Noted: 2017-11-14

## 2017-12-12 NOTE — TELEPHONE ENCOUNTER
Panel Management Review      Patient has the following on her problem list:     Hypertension   Last three blood pressure readings:  BP Readings from Last 3 Encounters:   11/10/17 136/78   05/19/17 156/90   04/08/16 124/74     Blood pressure:     HTN Guidelines:  Age 18-59 BP range:  Less than 140/90  Age 60-85 with Diabetes:  Less than 140/90  Age 60-85 without Diabetes:  less than 150/90        Composite cancer screening  Chart review shows that this patient is due/due soon for the following Fecal Colorectal (FIT)  Summary:    Patient is due/failing the following:   FIT    Action needed:   Complete Fit test    Type of outreach:    Sent letter.    Questions for provider review:    None                                                                                                                                    Cindy Lock MA       Closing encounter 3rd attempt .

## 2018-11-08 DIAGNOSIS — I10 ESSENTIAL HYPERTENSION, BENIGN: ICD-10-CM

## 2018-11-08 RX ORDER — TRIAMTERENE AND HYDROCHLOROTHIAZIDE 37.5; 25 MG/1; MG/1
CAPSULE ORAL
Qty: 90 CAPSULE | Refills: 3 | OUTPATIENT
Start: 2018-11-08

## 2018-11-08 NOTE — TELEPHONE ENCOUNTER
Duplicate.     triamterene-hydrochlorothiazide (DYAZIDE) 37.5-25 MG per capsule was filled on 11/5/2018, qty 30 with 0 refills.

## 2018-12-11 DIAGNOSIS — N95.1 SYMPTOMATIC MENOPAUSAL OR FEMALE CLIMACTERIC STATES: ICD-10-CM

## 2018-12-11 RX ORDER — ESTROGEN,CON/M-PROGEST ACET 0.3-1.5MG
TABLET ORAL
Qty: 28 TABLET | Refills: 0 | Status: SHIPPED | OUTPATIENT
Start: 2018-12-11 | End: 2019-01-06

## 2018-12-11 NOTE — TELEPHONE ENCOUNTER
"Requested Prescriptions   Pending Prescriptions Disp Refills     PREMPRO 0.3-1.5 MG tablet [Pharmacy Med Name: PREMPRO 0.3 MG-1.5 MG TABLET]    Last Written Prescription Date:  11/10/2017  Last Fill Quantity: 90,  # refills: 3   Last office visit: 11/10/2017 with prescribing provider:  Shena Alejo     Future Office Visit:   Next 5 appointments (look out 90 days)    Jan 25, 2019  8:30 AM CST  PHYSICAL with Shena Alejo MD  [facility] ([facility]) [address] [place] [address]  [address]  [facility] [address]  [phone]          84 tablet 2     Sig: TAKE 1 TABLET BY MOUTH DAILY    Hormone Replacement Therapy Failed - 12/11/2018  2:24 AM       Failed - Blood pressure under 140/90 in past 12 months    BP Readings from Last 3 Encounters:   11/10/17 136/78   05/19/17 156/90   04/08/16 124/74                Failed - Recent (12 mo) or future (30 days) visit within the authorizing provider's specialty    Patient had office visit in the last 12 months or has a visit in the next 30 days with authorizing provider or within the authorizing provider's specialty.  See \"Patient Info\" tab in inbasket, or \"Choose Columns\" in Meds & Orders section of the refill encounter.             Passed - Patient has mammogram in past 2 years on file if age 50-75       Passed - Patient is 18 years of age or older       Passed - No active pregnancy on record       Passed - No positive pregnancy test on record in past 12 months              "

## 2018-12-11 NOTE — TELEPHONE ENCOUNTER
Appointment scheduled.   Next 5 appointments (look out 90 days)    Jan 25, 2019  8:30 AM CST  PHYSICAL with Shena Alejo MD  [facility] ([facility]) [address] [place] [address]  [address] [place] [address] [phone]        Medication is being filled for 1 time refill only due to:  due for appt Joanne Toro, RN  Message handled by Nurse Triage.

## 2018-12-13 DIAGNOSIS — I10 ESSENTIAL HYPERTENSION, BENIGN: ICD-10-CM

## 2018-12-13 NOTE — TELEPHONE ENCOUNTER
Not due for a refill.     triamterene-hydrochlorothiazide (DYAZIDE) 37.5-25 MG per capsule was filled on 11/14/2018, qty 90 with 0 refills.

## 2018-12-17 RX ORDER — TRIAMTERENE AND HYDROCHLOROTHIAZIDE 37.5; 25 MG/1; MG/1
CAPSULE ORAL
Qty: 90 CAPSULE | Refills: 0 | OUTPATIENT
Start: 2018-12-17

## 2019-01-06 DIAGNOSIS — N95.1 SYMPTOMATIC MENOPAUSAL OR FEMALE CLIMACTERIC STATES: ICD-10-CM

## 2019-01-07 NOTE — TELEPHONE ENCOUNTER
"Requested Prescriptions   Pending Prescriptions Disp Refills     PREMPRO 0.3-1.5 MG tablet [Pharmacy Med Name: PREMPRO 0.3 MG-1.5 MG TABLET]    Last Written Prescription Date:  12/11/2018  Last Fill Quantity: 28,  # refills: 0   Last office visit: 11/10/2017 with prescribing provider:  Shena Alejo     Future Office Visit:   Next 5 appointments (look out 90 days)    Jan 25, 2019  8:30 AM CST  PHYSICAL with Shena Alejo MD  [facility] ([facility]) [address] [place] [address]  [address]  [facility] [address]  [phone]          28 tablet 0     Sig: TAKE 1 TABLET BY MOUTH EVERY DAY    Hormone Replacement Therapy Failed - 1/6/2019 12:52 AM       Failed - Blood pressure under 140/90 in past 12 months    BP Readings from Last 3 Encounters:   11/10/17 136/78   05/19/17 156/90   04/08/16 124/74                Passed - Recent (12 mo) or future (30 days) visit within the authorizing provider's specialty    Patient had office visit in the last 12 months or has a visit in the next 30 days with authorizing provider or within the authorizing provider's specialty.  See \"Patient Info\" tab in inbasket, or \"Choose Columns\" in Meds & Orders section of the refill encounter.             Passed - Patient has mammogram in past 2 years on file if age 50-75       Passed - Medication is active on med list       Passed - Patient is 18 years of age or older       Passed - No active pregnancy on record       Passed - No positive pregnancy test on record in past 12 months          "

## 2019-01-09 RX ORDER — ESTROGEN,CON/M-PROGEST ACET 0.3-1.5MG
TABLET ORAL
Qty: 28 TABLET | Refills: 0 | Status: SHIPPED | OUTPATIENT
Start: 2019-01-09 | End: 2019-02-12

## 2019-01-09 NOTE — TELEPHONE ENCOUNTER
Routing refill request to provider for review/approval because:  Lola given x1, appointment is scheduled. Failed RN Refill Protocol.    Nanda Norwood RN

## 2019-01-31 ENCOUNTER — TELEPHONE (OUTPATIENT)
Dept: PEDIATRICS | Facility: CLINIC | Age: 69
End: 2019-01-31

## 2019-01-31 NOTE — TELEPHONE ENCOUNTER
Panel Management Review          Composite cancer screening  Chart review shows that this patient is due/due soon for the following Fecal Colorectal (FIT)  Summary:    Patient is due/failing the following:   FIT    Action needed:   Patient needs non-fasting lab only appointment    Type of outreach:    Sent letter.    Questions for provider review:    None                                                                                                                                    Juliana Decker CMA([facility])

## 2019-01-31 NOTE — LETTER
January 31, 2019      Daria Driscoll  [address]  [facility] [address]        Dear Daria,       We care about your health and have reviewed your health plan including your medical conditions, medications, and lab results.  Based on this review, it is recommended that you follow up regarding the following health topic(s):  -Colon Cancer Screening    We recommend you take the following action(s):  -Call the clinic to arrange to  a FIT test (test for hidden blood in the stool) to complete at home and return to the clinic.     Please call us at the [facility] - [phone] (or use [employer]) to address the above recommendations.     Thank you for trusting [facility] and we appreciate the opportunity to serve you.  We look forward to supporting your healthcare needs in the future.    Healthy Regards,    Your Health Care Team  [facility]

## 2019-02-12 DIAGNOSIS — N95.1 SYMPTOMATIC MENOPAUSAL OR FEMALE CLIMACTERIC STATES: ICD-10-CM

## 2019-02-12 NOTE — TELEPHONE ENCOUNTER
"Requested Prescriptions   Pending Prescriptions Disp Refills     estrogen conj-medroxyPROGESTERone (PREMPRO) 0.3-1.5 MG tablet  Last Written Prescription Date:  01/09/2019  Last Fill Quantity: 28 tablet,  # refills: 0   Last office visit: 11/10/2017 with prescribing provider:  Shena Alejo MD   Future Office Visit:     28 tablet 0     Sig: Take 1 tablet by mouth daily    Hormone Replacement Therapy Failed - 2/12/2019  9:50 AM       Failed - Blood pressure under 140/90 in past 12 months    BP Readings from Last 3 Encounters:   11/10/17 136/78   05/19/17 156/90   04/08/16 124/74                Failed - Recent (12 mo) or future (30 days) visit within the authorizing provider's specialty    Patient had office visit in the last 12 months or has a visit in the next 30 days with authorizing provider or within the authorizing provider's specialty.  See \"Patient Info\" tab in inbasket, or \"Choose Columns\" in Meds & Orders section of the refill encounter.             Passed - Patient has mammogram in past 2 years on file if age 50-75       Passed - Medication is active on med list       Passed - Patient is 18 years of age or older       Passed - No active pregnancy on record       Passed - No positive pregnancy test on record in past 12 months          "

## 2019-02-12 NOTE — TELEPHONE ENCOUNTER
Routing refill request to provider for review/approval because:  Lola given x2 and patient did not follow up, please advise  Labs not current:  BP  Patient needs to be seen because it has been more than 1 year since last office visit.  Maribeth WILLETT RN - Triage  [facility]

## 2019-02-24 DIAGNOSIS — I10 ESSENTIAL HYPERTENSION, BENIGN: ICD-10-CM

## 2019-02-24 NOTE — TELEPHONE ENCOUNTER
"Requested Prescriptions   Pending Prescriptions Disp Refills     triamterene-HCTZ (DYAZIDE) 37.5-25 MG capsule  Last Written Prescription Date:  11/14/2018  Last Fill Quantity: 90 capsule,  # refills: 0   Last office visit: 11/10/2017 with prescribing provider:  Shena Alejo MD    Future Office Visit:   Next 5 appointments (look out 90 days)    May 17, 2019  8:30 AM CDT  PHYSICAL with Shena Alejo MD  [facility] ([facility]) [address] [place] [address]  [address]  [facility] [address] [phone]          90 capsule 0     Sig: TAKE 1 CAPSULE BY MOUTH EVERY MORNING    Diuretics (Including Combos) Protocol Failed - 2/24/2019 11:28 AM       Failed - Blood pressure under 140/90 in past 12 months    BP Readings from Last 3 Encounters:   11/10/17 136/78   05/19/17 156/90   04/08/16 124/74                Failed - Recent (12 mo) or future (30 days) visit within the authorizing provider's specialty    Patient had office visit in the last 12 months or has a visit in the next 30 days with authorizing provider or within the authorizing provider's specialty.  See \"Patient Info\" tab in inbasket, or \"Choose Columns\" in Meds & Orders section of the refill encounter.             Failed - Normal serum creatinine on file in past 12 months    Recent Labs   Lab Test 05/19/17  1036   CR 0.78             Failed - Normal serum potassium on file in past 12 months    Recent Labs   Lab Test 05/19/17  1036   POTASSIUM 3.8                   Failed - Normal serum sodium on file in past 12 months    Recent Labs   Lab Test 05/19/17  1036                Passed - Medication is active on med list       Passed - Patient is age 18 or older       Passed - No active pregancy on record       Passed - No positive pregnancy test in past 12 months          "

## 2019-02-27 RX ORDER — TRIAMTERENE AND HYDROCHLOROTHIAZIDE 37.5; 25 MG/1; MG/1
CAPSULE ORAL
Qty: 90 CAPSULE | Refills: 0 | Status: SHIPPED | OUTPATIENT
Start: 2019-02-27 | End: 2019-05-22

## 2019-02-27 NOTE — TELEPHONE ENCOUNTER
Please call patient to make earlier appointment.  I know she has a long drive, but that May visit will make her very overdue.  She has rescheduled 3 times now.  Her last labs were 5/17.  Even April would be better.    Shena Alejo MD

## 2019-02-27 NOTE — TELEPHONE ENCOUNTER
Overdue for physical, LOV was on 11/10/17. Labs are very overdue as well.     Routing to station to call the Pt and schedule an OV.     Ok to route to the provider once the Pt is scheduled.     Yvette Knox RN -- [facility]

## 2019-02-27 NOTE — TELEPHONE ENCOUNTER
Spoke with pt and states she cannot come any sooner I offered same days to fit her in sooner but declined states if she changes her mind she will call back. Routing to PCP    Cindy Lock MA

## 2019-04-24 DIAGNOSIS — I10 ESSENTIAL HYPERTENSION, BENIGN: ICD-10-CM

## 2019-04-24 DIAGNOSIS — N95.1 SYMPTOMATIC MENOPAUSAL OR FEMALE CLIMACTERIC STATES: ICD-10-CM

## 2019-04-24 NOTE — TELEPHONE ENCOUNTER
"Requested Prescriptions   Pending Prescriptions Disp Refills     cloNIDine (CATAPRES) 0.1 MG tablet [Pharmacy Med Name: CLONIDINE HCL 0.1 MG TABLET]  Last Written Prescription Date:  11/14/2018  Last Fill Quantity: 90 tablet,  # refills: 0    Last office visit: 11/10/2017 with prescribing provider:  Shena Alejo MD        Future Office Visit:   Next 5 appointments (look out 90 days)    May 31, 2019  1:50 PM CDT  PHYSICAL with Shena Alejo MD  [facility] ([facility]) [address] [place] [address]  [address]  [facility] [address]  [phone]          90 tablet 0     Sig: TAKE 1 TABLET (0.1 MG) BY MOUTH AT BEDTIME       Central Acting Antiadrenergic Agents Failed - 4/24/2019  9:16 AM        Failed - Blood pressure under 140/90 in past 12 months     BP Readings from Last 3 Encounters:   11/10/17 136/78   05/19/17 156/90   04/08/16 124/74                 Failed - Recent (12 mo) or future (30 days) visit within the authorizing provider's specialty     Patient had office visit in the last 12 months or has a visit in the next 30 days with authorizing provider or within the authorizing provider's specialty.  See \"Patient Info\" tab in inbasket, or \"Choose Columns\" in Meds & Orders section of the refill encounter.              Failed - Normal serum creatinine on file within past 12 months     Recent Labs   Lab Test 05/19/17  1036   CR 0.78             Passed - Patient is 6 years of age or older        Passed - Medication is active on med list        Passed - Patient not pregnant        Passed - No positive pregnancy test on file in past 12 months          "

## 2019-04-26 RX ORDER — CLONIDINE HYDROCHLORIDE 0.1 MG/1
0.1 TABLET ORAL AT BEDTIME
Qty: 30 TABLET | Refills: 0 | Status: SHIPPED | OUTPATIENT
Start: 2019-04-26 | End: 2019-05-17

## 2019-04-26 NOTE — TELEPHONE ENCOUNTER
Routing refill request to provider for review/approval because:  Labs out of range:  BP  Labs not current:  CR  A break in medication: received a 90 day on 11/14/18  Patient needs to be seen because:  Patient has not been seen since 11/10/2017.     T'd up with a 30 day supply.     Please review and advise on refill.     Diamond Rolon RN Flex

## 2019-05-07 DIAGNOSIS — N95.1 SYMPTOMATIC MENOPAUSAL OR FEMALE CLIMACTERIC STATES: ICD-10-CM

## 2019-05-07 NOTE — TELEPHONE ENCOUNTER
"Requested Prescriptions   Pending Prescriptions Disp Refills     PREMPRO 0.3-1.5 MG tablet [Pharmacy Med Name: PREMPRO 0.3 MG-1.5 MG TABLET]  Last Written Prescription Date:  2/12/19  Last Fill Quantity: 90,  # refills: 0    Last office visit: 11/10/2017 with prescribing provider:  Shena Alejo MD        Future Office Visit:   Next 5 appointments (look out 90 days)    May 31, 2019  1:50 PM CDT  PHYSICAL with Shena Alejo MD  [facility] ([facility]) [address] [place] [address]  [address]  [facility] [id number] [phone]          84 tablet 0     Sig: TAKE 1 TABLET BY MOUTH EVERY DAY       Hormone Replacement Therapy Failed - 5/7/2019  2:35 PM        Failed - Blood pressure under 140/90 in past 12 months     BP Readings from Last 3 Encounters:   11/10/17 136/78   05/19/17 156/90   04/08/16 124/74           Passed - Recent (12 mo) or future (30 days) visit within the authorizing provider's specialty     Patient had office visit in the last 12 months or has a visit in the next 30 days with authorizing provider or within the authorizing provider's specialty.  See \"Patient Info\" tab in inbasket, or \"Choose Columns\" in Meds & Orders section of the refill encounter.            Passed - Patient has mammogram in past 2 years on file if age 50-75        Passed - Medication is active on med list        Passed - Patient is 18 years of age or older        Passed - No active pregnancy on record        Passed - No positive pregnancy test on record in past 12 months      Routing refill request to provider for review/approval because:  Labs not current:  BP  Patient needs to be seen because it has been more than 1 year since last office visit.    María Sky RN          "

## 2019-05-09 RX ORDER — ESTROGEN,CON/M-PROGEST ACET 0.3-1.5MG
TABLET ORAL
Qty: 30 TABLET | Refills: 0 | Status: SHIPPED | OUTPATIENT
Start: 2019-05-09 | End: 2019-06-21

## 2019-05-22 DIAGNOSIS — I10 ESSENTIAL HYPERTENSION, BENIGN: ICD-10-CM

## 2019-05-22 NOTE — TELEPHONE ENCOUNTER
"Requested Prescriptions   Pending Prescriptions Disp Refills     triamterene-HCTZ (DYAZIDE) 37.5-25 MG capsule [Pharmacy Med Name: TRIAMTERENE-HCTZ 37.5-25 MG CP] 90 capsule 0     Sig: TAKE 1 CAPSULE BY MOUTH EVERY DAY IN THE MORNING  Last Written Prescription Date:  02/27/2019  Last Fill Quantity: 90 capsule,  # refills: 0    Last office visit:11/10/2017 :  Shena Alejo MD       Future Office Visit:           Diuretics (Including Combos) Protocol Failed - 5/22/2019 11:39 AM        Failed - Blood pressure under 140/90 in past 12 months     BP Readings from Last 3 Encounters:   11/10/17 136/78   05/19/17 156/90   04/08/16 124/74             Failed - Recent (12 mo) or future (30 days) visit within the authorizing provider's specialty     Patient had office visit in the last 12 months or has a visit in the next 30 days with authorizing provider or within the authorizing provider's specialty.  See \"Patient Info\" tab in inbasket, or \"Choose Columns\" in Meds & Orders section of the refill encounter.            Failed - Normal serum creatinine on file in past 12 months     Recent Labs   Lab Test 05/19/17  1036   CR 0.78              Failed - Normal serum potassium on file in past 12 months     Recent Labs   Lab Test 05/19/17  1036   POTASSIUM 3.8                    Failed - Normal serum sodium on file in past 12 months     Recent Labs   Lab Test 05/19/17  1036                 Passed - Medication is active on med list        Passed - Patient is age 18 or older        Passed - No active pregancy on record        Passed - No positive pregnancy test in past 12 months      Routing refill request to provider for review/approval because:  Labs not current:  NA, Potassium, CR, BP  Patient needs to be seen because it has been more than 1 year since last office visit.    María Sky RN            "

## 2019-05-23 RX ORDER — TRIAMTERENE AND HYDROCHLOROTHIAZIDE 37.5; 25 MG/1; MG/1
CAPSULE ORAL
Qty: 90 CAPSULE | Refills: 0 | Status: SHIPPED | OUTPATIENT
Start: 2019-05-23 | End: 2019-06-21

## 2019-05-23 NOTE — TELEPHONE ENCOUNTER
Pt has cancelled many appts and Dr. Alejo made it clear that she needed to be seen. However, her last 2 appts were cancelled by us, so I refilled 90 more days. Please let pt know she needs appt here or elsewhere and no fills will be given after that for any of her meds.

## 2019-05-23 NOTE — TELEPHONE ENCOUNTER
Patient has appointment scheduled for 6/21 with Dr. Alejo that at this time, is not cancelled. Closing out encounter.     Chelsea Edward CMA on 4/23/2019 at 9:32 AM

## 2019-06-21 ENCOUNTER — ANCILLARY PROCEDURE (OUTPATIENT)
Dept: MAMMOGRAPHY | Facility: CLINIC | Age: 69
End: 2019-06-21
Attending: INTERNAL MEDICINE
Payer: COMMERCIAL

## 2019-06-21 ENCOUNTER — OFFICE VISIT (OUTPATIENT)
Dept: PEDIATRICS | Facility: CLINIC | Age: 69
End: 2019-06-21
Payer: COMMERCIAL

## 2019-06-21 VITALS
HEIGHT: 63 IN | DIASTOLIC BLOOD PRESSURE: 76 MMHG | SYSTOLIC BLOOD PRESSURE: 118 MMHG | TEMPERATURE: 97.6 F | HEART RATE: 74 BPM | BODY MASS INDEX: 33.73 KG/M2 | WEIGHT: 190.4 LBS | OXYGEN SATURATION: 97 %

## 2019-06-21 DIAGNOSIS — N95.1 SYMPTOMATIC MENOPAUSAL OR FEMALE CLIMACTERIC STATES: ICD-10-CM

## 2019-06-21 DIAGNOSIS — R73.01 IMPAIRED FASTING GLUCOSE: ICD-10-CM

## 2019-06-21 DIAGNOSIS — Z13.220 SCREENING FOR LIPID DISORDERS: ICD-10-CM

## 2019-06-21 DIAGNOSIS — Z12.31 VISIT FOR SCREENING MAMMOGRAM: ICD-10-CM

## 2019-06-21 DIAGNOSIS — Z00.00 ENCOUNTER FOR MEDICARE ANNUAL WELLNESS EXAM: Primary | ICD-10-CM

## 2019-06-21 DIAGNOSIS — I10 ESSENTIAL HYPERTENSION, BENIGN: ICD-10-CM

## 2019-06-21 DIAGNOSIS — Z12.11 COLON CANCER SCREENING: ICD-10-CM

## 2019-06-21 LAB
ANION GAP SERPL CALCULATED.3IONS-SCNC: 9 MMOL/L (ref 3–14)
BUN SERPL-MCNC: 16 MG/DL (ref 7–30)
CALCIUM SERPL-MCNC: 9.1 MG/DL (ref 8.5–10.1)
CHLORIDE SERPL-SCNC: 106 MMOL/L (ref 94–109)
CHOLEST SERPL-MCNC: 213 MG/DL
CO2 SERPL-SCNC: 24 MMOL/L (ref 20–32)
CREAT SERPL-MCNC: 0.87 MG/DL (ref 0.52–1.04)
GFR SERPL CREATININE-BSD FRML MDRD: 69 ML/MIN/{1.73_M2}
GLUCOSE SERPL-MCNC: 99 MG/DL (ref 70–99)
HDLC SERPL-MCNC: 42 MG/DL
LDLC SERPL CALC-MCNC: 136 MG/DL
NONHDLC SERPL-MCNC: 171 MG/DL
POTASSIUM SERPL-SCNC: 3.8 MMOL/L (ref 3.4–5.3)
SODIUM SERPL-SCNC: 139 MMOL/L (ref 133–144)
TRIGL SERPL-MCNC: 176 MG/DL

## 2019-06-21 PROCEDURE — 36415 COLL VENOUS BLD VENIPUNCTURE: CPT | Performed by: INTERNAL MEDICINE

## 2019-06-21 PROCEDURE — 80061 LIPID PANEL: CPT | Performed by: INTERNAL MEDICINE

## 2019-06-21 PROCEDURE — G0009 ADMIN PNEUMOCOCCAL VACCINE: HCPCS | Performed by: INTERNAL MEDICINE

## 2019-06-21 PROCEDURE — 80048 BASIC METABOLIC PNL TOTAL CA: CPT | Performed by: INTERNAL MEDICINE

## 2019-06-21 PROCEDURE — 77067 SCR MAMMO BI INCL CAD: CPT | Mod: TC

## 2019-06-21 PROCEDURE — 90732 PPSV23 VACC 2 YRS+ SUBQ/IM: CPT | Performed by: INTERNAL MEDICINE

## 2019-06-21 PROCEDURE — 99397 PER PM REEVAL EST PAT 65+ YR: CPT | Mod: 25 | Performed by: INTERNAL MEDICINE

## 2019-06-21 RX ORDER — TRIAMTERENE AND HYDROCHLOROTHIAZIDE 37.5; 25 MG/1; MG/1
CAPSULE ORAL
Qty: 90 CAPSULE | Refills: 3 | Status: SHIPPED | OUTPATIENT
Start: 2019-06-21 | End: 2020-08-31

## 2019-06-21 RX ORDER — CLONIDINE HYDROCHLORIDE 0.1 MG/1
0.1 TABLET ORAL AT BEDTIME
Qty: 90 TABLET | Refills: 3 | Status: SHIPPED | OUTPATIENT
Start: 2019-06-21 | End: 2020-06-18

## 2019-06-21 ASSESSMENT — ACTIVITIES OF DAILY LIVING (ADL): CURRENT_FUNCTION: NO ASSISTANCE NEEDED

## 2019-06-21 ASSESSMENT — MIFFLIN-ST. JEOR: SCORE: 1359.84

## 2019-06-21 NOTE — LETTER
[facility]  [address] [address]                  [phone]   June 24, 2019    Daria Driscoll  [address]  [place] [address]      Dear Daria,    Here is a summary of your recent test results:    Your electrolytes, kidney function and blood sugar are normal.   Your cholesterol isn't quite as good as it has been.  Your 10-year ASCVD risk score (risk of heart attack or stroke in the next 10 years) is 8.8% which  Is borderline.  Ideal is <7.5% and medication is recommended at 10% or higher.   Try to work on more fruits, vegetables and whole grains in your diet.   Try to increase fiber and plant stanols and sterols to help lower your LDL.  The National Cholesterol Education Program recommends that people who have high cholesterol get 2 grams of stanols or sterols a day.  This is found naturally in some foods but also available as supplements and some foods are fortified, like benecol margarine and some yogurts and orange juice and granola bars.  Let me know if you would like to see a dietician to discuss this further.  Some web resources:   https://www.everydayhealth.com/high-cholesterol/diet/lowering-cholesterol-plant-sterols/   http://www.christi.org/info/lower-cholesterol-with-plant-sterols-and-stanols.html   https://www.SolFocus.NetMinder/article/32522-foods-fortified-plant-sterols-stanols/     We can recheck labs next year and consider medications if it is getting worse.  Please let us know if you have any questions or concerns. Hope you have a great summer at [facility]!         Please contact me if you have any questions.           Thank you very much for choosing [facility]    Best regards,  Shena Alejo MD      Results for orders placed or performed in visit on 06/21/19   Basic metabolic panel   Result Value Ref Range    Sodium 139 133 - 144 mmol/L    Potassium 3.8 3.4 - 5.3 mmol/L    Chloride 106 94 - 109 mmol/L    Carbon  Dioxide 24 20 - 32 mmol/L    Anion Gap 9 3 - 14 mmol/L    Glucose 99 70 - 99 mg/dL    Urea Nitrogen 16 7 - 30 mg/dL    Creatinine 0.87 0.52 - 1.04 mg/dL    GFR Estimate 69 >60 mL/min/[1.73_m2]    GFR Estimate If Black 79 >60 mL/min/[1.73_m2]    Calcium 9.1 8.5 - 10.1 mg/dL   Lipid panel reflex to direct LDL Fasting   Result Value Ref Range    Cholesterol 213 (H) <200 mg/dL    Triglycerides 176 (H) <150 mg/dL    HDL Cholesterol 42 (L) >49 mg/dL    LDL Cholesterol Calculated 136 (H) <100 mg/dL    Non HDL Cholesterol 171 (H) <130 mg/dL

## 2019-06-21 NOTE — PATIENT INSTRUCTIONS
Patient Education   Personalized Prevention Plan  You are due for the preventive services outlined below.  Your care team is available to assist you in scheduling these services.  If you have already completed any of these items, please share that information with your care team to update in your medical record.  Health Maintenance Due   Topic Date Due     ANNUAL REVIEW OF HM ORDERS  1950     FIT Test  10/01/2015     Zoster (Shingles) Vaccine (2 of 3) 07/14/2017     Annual Wellness Visit  05/19/2018     Basic Metabolic Panel  05/19/2018     Cholesterol Lab  05/19/2018     Pneumococcal Vaccine (2 of 2 - PPSV23) 05/19/2018     Mammogram  05/19/2019     Talk to our pharmacy about getting the shingles vaccine.  It is a set of two shots that are at least 2 months apart.    There is no data for a multivitamin so skipping that is fine.  Try to remember the vitamin D more in the winter when you aren't getting sun.
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
## 2020-01-15 DIAGNOSIS — Z12.11 COLON CANCER SCREENING: ICD-10-CM

## 2020-01-15 PROCEDURE — 82274 ASSAY TEST FOR BLOOD FECAL: CPT | Performed by: INTERNAL MEDICINE

## 2020-01-19 LAB — HEMOCCULT STL QL IA: NEGATIVE

## 2020-06-18 DIAGNOSIS — N95.1 SYMPTOMATIC MENOPAUSAL OR FEMALE CLIMACTERIC STATES: ICD-10-CM

## 2020-06-18 DIAGNOSIS — I10 ESSENTIAL HYPERTENSION, BENIGN: ICD-10-CM

## 2020-06-18 RX ORDER — CLONIDINE HYDROCHLORIDE 0.1 MG/1
0.1 TABLET ORAL AT BEDTIME
Qty: 90 TABLET | Refills: 0 | Status: SHIPPED | OUTPATIENT
Start: 2020-06-18 | End: 2020-09-10

## 2020-06-18 NOTE — TELEPHONE ENCOUNTER
Routing refill request to provider for review/approval because:  Labs not current:  BP, CR  Patient needs to be seen because it has been more than 1 year since last office visit.  Nabila Garcia RN, BSN

## 2020-06-25 NOTE — TELEPHONE ENCOUNTER
The pt is aware of the providers message and she will call back to make an appointment.   Mirna De La Vega on 6/25/2020 at 3:34 PM

## 2020-08-20 ENCOUNTER — TRANSFERRED RECORDS (OUTPATIENT)
Dept: HEALTH INFORMATION MANAGEMENT | Facility: CLINIC | Age: 70
End: 2020-08-20

## 2020-08-25 ENCOUNTER — TRANSFERRED RECORDS (OUTPATIENT)
Dept: HEALTH INFORMATION MANAGEMENT | Facility: CLINIC | Age: 70
End: 2020-08-25

## 2020-09-10 DIAGNOSIS — I10 ESSENTIAL HYPERTENSION, BENIGN: ICD-10-CM

## 2020-09-10 DIAGNOSIS — N95.1 SYMPTOMATIC MENOPAUSAL OR FEMALE CLIMACTERIC STATES: ICD-10-CM

## 2020-09-10 RX ORDER — CLONIDINE HYDROCHLORIDE 0.1 MG/1
0.1 TABLET ORAL AT BEDTIME
Qty: 90 TABLET | Refills: 0 | Status: SHIPPED | OUTPATIENT
Start: 2020-09-10 | End: 2020-09-25

## 2020-09-10 NOTE — TELEPHONE ENCOUNTER
Routing refill request to provider for review/approval because:  Labs not current:  creatinine  Failing bp    Video visit scheduled 9/25/20    Gale Law RN

## 2020-09-10 NOTE — TELEPHONE ENCOUNTER
Please call pt to make sure she knows that for video visit she has to be in [place] ([place]  Also should check BP at home prior to appointment.  Ideally we would send lab orders to somewhere near home so she could get labs, is there somewhere she could do that?

## 2020-09-18 NOTE — TELEPHONE ENCOUNTER
Spoke with pt and she states she is in [place] currently at her [facility] and will be at the time of appt and has nowhere near by to go for labs. She declines need for labs at this time as she has company and is very busy. She also does not have her BP machine and does not want to go get it checked. Declines mychart as well. Would like a link sent at the time of her AWV    Routing to PCP as BAKARI Lock MA 9:40 AM 9/18/2020

## 2020-09-25 ENCOUNTER — VIRTUAL VISIT (OUTPATIENT)
Dept: PEDIATRICS | Facility: CLINIC | Age: 70
End: 2020-09-25
Payer: COMMERCIAL

## 2020-09-25 VITALS — SYSTOLIC BLOOD PRESSURE: 129 MMHG | DIASTOLIC BLOOD PRESSURE: 79 MMHG | HEART RATE: 69 BPM

## 2020-09-25 DIAGNOSIS — E78.5 HYPERLIPIDEMIA LDL GOAL <160: ICD-10-CM

## 2020-09-25 DIAGNOSIS — Z00.00 ENCOUNTER FOR MEDICARE ANNUAL WELLNESS EXAM: Primary | ICD-10-CM

## 2020-09-25 DIAGNOSIS — I10 ESSENTIAL HYPERTENSION, BENIGN: ICD-10-CM

## 2020-09-25 DIAGNOSIS — R73.01 IMPAIRED FASTING GLUCOSE: ICD-10-CM

## 2020-09-25 DIAGNOSIS — B35.3 TINEA PEDIS, UNSPECIFIED LATERALITY: ICD-10-CM

## 2020-09-25 DIAGNOSIS — N95.1 SYMPTOMATIC MENOPAUSAL OR FEMALE CLIMACTERIC STATES: ICD-10-CM

## 2020-09-25 PROCEDURE — 99397 PER PM REEVAL EST PAT 65+ YR: CPT | Mod: 95 | Performed by: INTERNAL MEDICINE

## 2020-09-25 RX ORDER — TRIAMTERENE AND HYDROCHLOROTHIAZIDE 37.5; 25 MG/1; MG/1
CAPSULE ORAL
Qty: 90 CAPSULE | Refills: 0 | Status: SHIPPED | OUTPATIENT
Start: 2020-09-25 | End: 2021-04-30

## 2020-09-25 RX ORDER — ESTROGEN,CON/M-PROGEST ACET 0.3-1.5MG
1 TABLET ORAL DAILY
Qty: 84 TABLET | Refills: 3 | Status: SHIPPED | OUTPATIENT
Start: 2020-09-25 | End: 2021-09-10

## 2020-09-25 RX ORDER — CLONIDINE HYDROCHLORIDE 0.1 MG/1
0.1 TABLET ORAL AT BEDTIME
Qty: 90 TABLET | Refills: 4 | Status: SHIPPED | OUTPATIENT
Start: 2020-09-25 | End: 2022-03-30

## 2020-09-25 NOTE — PATIENT INSTRUCTIONS
You need to get fasting labs done - we will mail you the orders to take to [place].     Get your flu shot this fall - do the flu block or high dose.    Continue to be active - 150 mins per week of activity is recommended but every little bit counts.    Switch the aspirin to an 81mg daily or take the 325mg only 3 times a week.    Take vitamin D during the winter - 2000 units daily.        Patient Education   Personalized Prevention Plan  You are due for the preventive services outlined below.  Your care team is available to assist you in scheduling these services.  If you have already completed any of these items, please share that information with your care team to update in your medical record.  Health Maintenance Due   Topic Date Due     Basic Metabolic Panel  06/21/2020     Cholesterol Lab  06/21/2020     Flu Vaccine (1) 09/01/2020

## 2020-09-25 NOTE — PROGRESS NOTES
"Daria Driscoll is a 70 year old female who is being evaluated via a billable video visit.      The patient has been notified of following:     \"This video visit will be conducted via a call between you and your physician/provider. We have found that certain health care needs can be provided without the need for an in-person physical exam.  This service lets us provide the care you need with a video conversation.  If a prescription is necessary we can send it directly to your pharmacy.  If lab work is needed we can place an order for that and you can then stop by our lab to have the test done at a later time.    Video visits are billed at different rates depending on your insurance coverage.  Please reach out to your insurance provider with any questions.    If during the course of the call the physician/provider feels a video visit is not appropriate, you will not be charged for this service.\"    Patient has given verbal consent for Video visit? Yes  How would you like to obtain your AVS? Mail a copy  If you are dropped from the video visit, the video invite should be resent to: Send to e-mail at: [email or web address]  Will anyone else be joining your video visit? No      Subjective     Daria Driscoll is a 70 year old female who presents today via video visit for the following health issues:    HPI    Annual Wellness Visit    Patient has been advised of split billing requirements and indicates understanding: Yes     Are you in the first 12 months of your Medicare Part B coverage?  No    Physical Health:    In general, how would you rate your overall physical health? good    Outside of work, how many days during the week do you exercise?6-7 days/week    Outside of work, approximately how many minutes a day do you exercise?15-30 minutes    If you drink alcohol do you typically have >3 drinks per day or >7 drinks per week? No    Do you usually eat at least 4 servings of fruit and vegetables a day, include whole " "grains & fiber and avoid regularly eating high fat or \"junk\" foods? Yes    Do you have any problems taking medications regularly? No    Do you have any side effects from medications? none    Needs assistance for the following daily activities: no assistance needed    Which of the following safety concerns are present in your home?  none identified     Hearing impairment: No    In the past 6 months, have you been bothered by leaking of urine? no    There were no vitals taken for this visit.  Weight: Unable to obtain due to video visit  Height: Unable to obtain due to video visit  BMI: Unable to obtain due to video visit  Blood Pressure: Provided by patient 129/79 P 69    Mental Health:    In general, how would you rate your overall mental or emotional health? good  PHQ-2 Score:    PHQ-2 Score:     PHQ-2 ( 1999 Pfizer) 9/25/2020 6/21/2019   Q1: Little interest or pleasure in doing things 0 0   Q2: Feeling down, depressed or hopeless 0 0   PHQ-2 Score 0 0   Q1: Little interest or pleasure in doing things - Not at all   Q2: Feeling down, depressed or hopeless - Not at all   PHQ-2 Score - 0       Do you feel safe in your environment? Yes    Have you ever done Advance Care Planning? (For example, a Health Directive, POLST, or a discussion with a medical provider or your loved ones about your wishes)? Yes, advance care planning is on file.    Fall risk:  Fallen 2 or more times in the past year?: No  Any fall with injury in the past year?: No    Cognitive Screening: Unable to complete due to virtual visit; need for additional assessment in future face-to-face visit    Do you have sleep apnea, excessive snoring or daytime drowsiness?: no    Current providers sharing in care for this patient include:   Patient Care Team:  Shena Alejo MD as PCP - General  Shena Alejo MD as Assigned PCP    Patient has been advised of split billing requirements and indicates understanding: Yes       Video Start Time: 1:24 PM    HTN: No " medication side effects.  Watching diet.    Post-menopausal symptoms - still has some hot flashes, not daily, wakes from sleep.       Review of Systems   Rash on feet that got better with cream from podiatry but is back now.  Chart review shows this was felt to be tinea and antifungal-topical steroid cream was prescribed.  Constitutional, skin, HEENT, cardiovascular, pulmonary, gi and gu systems are negative, except as otherwise noted.      Objective           Vitals:  No vitals were obtained today due to virtual visit.  BP Readings from Last 3 Encounters:   09/25/20 129/79   06/21/19 118/76   11/10/17 136/78     Physical Exam     GENERAL: Healthy, alert and no distress  EYES: Eyes grossly normal to inspection.  No discharge or erythema, or obvious scleral/conjunctival abnormalities.  RESP: No audible wheeze, cough, or visible cyanosis.  No visible retractions or increased work of breathing.    SKIN: Visible skin clear. No significant rash, abnormal pigmentation or lesions.  NEURO: Cranial nerves grossly intact.  Mentation and speech appropriate for age.  PSYCH: Mentation appears normal, affect normal/bright, judgement and insight intact, normal speech and appearance well-groomed.              Assessment & Plan     Encounter for Medicare annual wellness exam  Routine health education discussed: calcium, diet, exercise, weight, safety.     Impaired fasting glucose  Discussed glucose elevation.  Discuss this is a pre-diabetic condition.  Recommended eating healthily, exercising and maintaining a healthy weight to prevent the development of diabetes.  Recommended blood sugar checks at least yearly to monitor this.    Recheck labs    Hyperlipidemia LDL goal <160  recheck  - Lipid panel reflex to direct LDL Fasting; Future    Essential hypertension, benign  Controlled, continue medications and recheck  - **Basic metabolic panel FUTURE anytime; Future  - cloNIDine (CATAPRES) 0.1 MG tablet; Take 1 tablet (0.1 mg) by mouth  At Bedtime  - triamterene-HCTZ (DYAZIDE) 37.5-25 MG capsule; TAKE 1 CAPSULE BY MOUTH EVERY DAY IN THE MORNING    Symptomatic menopausal or female climacteric states  Still with some symptoms and prefers not to taper off.  Refilled.  - cloNIDine (CATAPRES) 0.1 MG tablet; Take 1 tablet (0.1 mg) by mouth At Bedtime  - estrogen conj-medroxyPROGESTERone (PREMPRO) 0.3-1.5 MG tablet; Take 1 tablet by mouth daily    Tinea     Prescription per orders.  Notify if not resolving    See Patient Instructions    Return in about 53 weeks (around 10/1/2021) for Annual Wellness Visit.    Shena Alejo MD  [facility]      Video-Visit Details    Type of service:  Video Visit     Video End Time:1:52 PM    Originating Location (pt. Location): [facility]    Distant Location (provider location):  [facility]     Platform used for Video Visit: Daryn

## 2020-12-02 ENCOUNTER — TRANSFERRED RECORDS (OUTPATIENT)
Dept: HEALTH INFORMATION MANAGEMENT | Facility: CLINIC | Age: 70
End: 2020-12-02

## 2020-12-02 LAB
CHOLEST SERPL-MCNC: 194 MG/DL
CREAT SERPL-MCNC: 0.91 MG/DL (ref 0.6–1.1)
GFR SERPL CREATININE-BSD FRML MDRD: 61 ML/MIN/1.73M2
GLUCOSE SERPL-MCNC: 118 MG/DL (ref 70–99)
HDLC SERPL-MCNC: 39 MG/DL
LDLC SERPL CALC-MCNC: 118 MG/DL
POTASSIUM SERPL-SCNC: 4 MEQ/L (ref 3.5–5.1)
TRIGL SERPL-MCNC: 183 MG/DL

## 2021-03-18 ENCOUNTER — TELEPHONE (OUTPATIENT)
Dept: PEDIATRICS | Facility: CLINIC | Age: 71
End: 2021-03-18

## 2021-03-18 NOTE — TELEPHONE ENCOUNTER
Reason for Call:  Other: FYI    Detailed comments: FYI only. Patient called to inform  that she and her spouse received the complete 2 doses of Moderna vaccine. Their second dose was administered 1.5 weeks ago at [facility].    Phone Number Patient can be reached at: Home number on file [phone] (home)    Best Time: N/A - No call back needed    Can we leave a detailed message on this number? No call back needed    Call taken on 3/18/2021 at 2:50 PM by Rhys Padilla

## 2021-04-30 DIAGNOSIS — I10 ESSENTIAL HYPERTENSION, BENIGN: ICD-10-CM

## 2021-04-30 RX ORDER — TRIAMTERENE AND HYDROCHLOROTHIAZIDE 37.5; 25 MG/1; MG/1
CAPSULE ORAL
Qty: 90 CAPSULE | Refills: 1 | Status: SHIPPED | OUTPATIENT
Start: 2021-04-30 | End: 2021-12-15

## 2021-04-30 NOTE — TELEPHONE ENCOUNTER
Reason for Call:  Medication or medication refill:    Do you use a [facility]?  Name of the pharmacy and phone number for the current request:  [facility]  [phone]    Name of the medication requested: Pending Prescriptions:                       Disp   Refills    triamterene-HCTZ (DYAZIDE) 37.5-25 MG cap*90 cap*0            Sig: TAKE 1 CAPSULE BY MOUTH EVERY DAY IN THE MORNING        Other request: Pt is out requesting this is sent to pharmacy ASAP.     Can we leave a detailed message on this number? Not Applicable    Phone number patient can be reached at: Home number on file [phone] (home)    Best Time: any    Call taken on 4/30/2021 at 11:11 AM by Sherrill Degroot

## 2021-04-30 NOTE — TELEPHONE ENCOUNTER
Routing refill request to provider for review/approval because:  Lola given x1 and patient did not follow up, please advise  Labs not current:  NA  A break in medication    Jadyn Nunez RN on 4/30/2021 at 11:18 AM

## 2021-09-09 DIAGNOSIS — N95.1 SYMPTOMATIC MENOPAUSAL OR FEMALE CLIMACTERIC STATES: ICD-10-CM

## 2021-09-10 RX ORDER — ESTROGEN,CON/M-PROGEST ACET 0.3-1.5MG
TABLET ORAL
Qty: 84 TABLET | Refills: 0 | Status: SHIPPED | OUTPATIENT
Start: 2021-09-10 | End: 2021-11-30

## 2021-09-10 NOTE — TELEPHONE ENCOUNTER
Routing refill request to provider for review/approval because:  Outdated Mammogram    Jadyn Nunez RN on 9/10/2021 at 10:40 AM

## 2021-09-29 NOTE — TELEPHONE ENCOUNTER
The pt is aware of the providers message and she will call back to make an appointment.   Mirna De La Vega on 9/29/2021 at 8:59 AM

## 2021-12-13 DIAGNOSIS — I10 ESSENTIAL HYPERTENSION, BENIGN: ICD-10-CM

## 2021-12-13 NOTE — LETTER
January 10, 2022      Daria Driscoll  [address]  [place] [facility] [place] [address]        Dear Daria,       We care about your health and have reviewed your health plan including your medical conditions, medications, and lab results.  Based on this review, it is recommended that you follow up regarding the following health topic(s):  -Follow up appointment needed for your medication.     Please call us at the [facility] - [phone] (or use [employer]) to address the above recommendations.     Thank you for trusting [facility] and we appreciate the opportunity to serve you.  We look forward to supporting your healthcare needs in the future.    Healthy Regards,    Your Health Care Team  [facility]

## 2021-12-15 RX ORDER — TRIAMTERENE AND HYDROCHLOROTHIAZIDE 37.5; 25 MG/1; MG/1
CAPSULE ORAL
Qty: 90 CAPSULE | Refills: 0 | Status: SHIPPED | OUTPATIENT
Start: 2021-12-15 | End: 2022-03-10

## 2021-12-15 NOTE — TELEPHONE ENCOUNTER
Medication is being filled for 1 time refill only due to:  Patient needs to be seen because it has been more than one year since last visit.    Patient has future appointment scheduled.  Maribeth KUMAR - Registered Nurse  [facility]  Acute and Diagnostic Services

## 2021-12-17 DIAGNOSIS — I10 ESSENTIAL HYPERTENSION, BENIGN: ICD-10-CM

## 2021-12-17 DIAGNOSIS — N95.1 SYMPTOMATIC MENOPAUSAL OR FEMALE CLIMACTERIC STATES: ICD-10-CM

## 2021-12-20 RX ORDER — CLONIDINE HYDROCHLORIDE 0.1 MG/1
0.1 TABLET ORAL AT BEDTIME
Qty: 90 TABLET | Refills: 4 | OUTPATIENT
Start: 2021-12-20

## 2021-12-20 NOTE — TELEPHONE ENCOUNTER
Routing refill request to provider for review/approval because:   Central Acting Antiadrenergic Agents Failed 12/17/2021 08:09 AM   Protocol Details  Blood pressure under 140/90 in past 12 months    Recent (12 mo) or future (30 days) visit within the authorizing provider's specialty    Normal serum creatinine on file within past 12 months        Tiffany Gray RN

## 2021-12-21 DIAGNOSIS — N95.1 SYMPTOMATIC MENOPAUSAL OR FEMALE CLIMACTERIC STATES: ICD-10-CM

## 2021-12-21 NOTE — LETTER
December 22, 2021      Daria Driscoll  [address]  [place] [facility] [place] [facility] [address]        Dear Daria,       We care about your health and have reviewed your health plan including your medical conditions, medications, and lab results.  Based on this review, it is recommended that you follow up regarding the following health topic(s):  -An appointment needs to be made with your care team before your medications can be refilled.     Please call us at the [facility] - [phone] (or use [employer]) to address the above recommendations.     Thank you for trusting [facility] and we appreciate the opportunity to serve you.  We look forward to supporting your healthcare needs in the future.    Healthy Regards,    Your Health Care Team  [facility]

## 2021-12-22 RX ORDER — ESTROGEN,CON/M-PROGEST ACET 0.3-1.5MG
TABLET ORAL
Qty: 28 TABLET | Refills: 0 | OUTPATIENT
Start: 2021-12-22

## 2021-12-22 NOTE — TELEPHONE ENCOUNTER
See refill encounter from 12/13/21. The pt was already called today. Letter mailed.   Mirna De La Vega on 12/22/2021 at 12:25 PM

## 2021-12-22 NOTE — TELEPHONE ENCOUNTER
Routing refill request to provider for review/approval because:  Hormone Replacement Therapy Failed 12/21/2021 03:07 PM   Protocol Details  Blood pressure under 140/90 in past 12 months    Recent (12 mo) or future (30 days) visit within the authorizing provider's specialty    Patient has mammogram in past 2 years on file if age 50-75     Tiffany Gray RN

## 2021-12-31 DIAGNOSIS — N95.1 SYMPTOMATIC MENOPAUSAL OR FEMALE CLIMACTERIC STATES: ICD-10-CM

## 2022-01-03 RX ORDER — ESTROGEN,CON/M-PROGEST ACET 0.3-1.5MG
TABLET ORAL
Qty: 28 TABLET | Refills: 0 | OUTPATIENT
Start: 2022-01-03

## 2022-01-03 NOTE — TELEPHONE ENCOUNTER
Routing refill request to provider for review/approval because:  Lola given x1 and patient did not follow up, please advise  Patient needs to be seen because it has been more than 1 year since last office visit.  Outdated BP  Outdated Mammogram    Jadyn Nunez RN on 1/3/2022 at 11:58 AM

## 2022-01-06 DIAGNOSIS — N95.1 SYMPTOMATIC MENOPAUSAL OR FEMALE CLIMACTERIC STATES: ICD-10-CM

## 2022-01-10 RX ORDER — ESTROGEN,CON/M-PROGEST ACET 0.3-1.5MG
TABLET ORAL
Qty: 28 TABLET | Refills: 0 | OUTPATIENT
Start: 2022-01-10

## 2022-01-10 NOTE — TELEPHONE ENCOUNTER
Routing refill request to provider for review/approval because:  Lola given x1 and patient did not follow up, please advise  Patient needs to be seen because it has been more than 1 year since last office visit.  Outdated BP  Due for Mammogram    Jadyn Nunez RN on 1/10/2022 at 11:57 AM

## 2022-03-27 DIAGNOSIS — N95.1 SYMPTOMATIC MENOPAUSAL OR FEMALE CLIMACTERIC STATES: ICD-10-CM

## 2022-03-27 DIAGNOSIS — I10 ESSENTIAL HYPERTENSION, BENIGN: ICD-10-CM

## 2022-03-30 RX ORDER — ESTROGEN,CON/M-PROGEST ACET 0.3-1.5MG
TABLET ORAL
Qty: 30 TABLET | Refills: 1 | Status: SHIPPED | OUTPATIENT
Start: 2022-03-30 | End: 2022-05-06

## 2022-03-30 RX ORDER — CLONIDINE HYDROCHLORIDE 0.1 MG/1
0.1 TABLET ORAL AT BEDTIME
Qty: 30 TABLET | Refills: 1 | Status: SHIPPED | OUTPATIENT
Start: 2022-03-30 | End: 2022-05-06

## 2022-03-30 RX ORDER — TRIAMTERENE AND HYDROCHLOROTHIAZIDE 37.5; 25 MG/1; MG/1
CAPSULE ORAL
Qty: 30 CAPSULE | Refills: 1 | Status: SHIPPED | OUTPATIENT
Start: 2022-03-30 | End: 2022-05-06

## 2022-03-30 NOTE — TELEPHONE ENCOUNTER
Routing refill request to provider for review/approval because:   Diuretics (Including Combos) Protocol Failed 03/27/2022 02:14 PM   Protocol Details  Blood pressure under 140/90 in past 12 months    Recent (12 mo) or future (30 days) visit within the authorizing provider's specialty    Normal serum creatinine on file in past 12 months    Normal serum potassium on file in past 12 months    Normal serum sodium on file in past 12 months        Tiffany Gray RN

## 2022-04-22 DIAGNOSIS — N95.1 SYMPTOMATIC MENOPAUSAL OR FEMALE CLIMACTERIC STATES: ICD-10-CM

## 2022-04-22 DIAGNOSIS — I10 ESSENTIAL HYPERTENSION, BENIGN: ICD-10-CM

## 2022-04-25 RX ORDER — CLONIDINE HYDROCHLORIDE 0.1 MG/1
0.1 TABLET ORAL AT BEDTIME
Qty: 30 TABLET | Refills: 1 | OUTPATIENT
Start: 2022-04-25

## 2022-05-06 ENCOUNTER — LAB (OUTPATIENT)
Dept: LAB | Facility: CLINIC | Age: 72
End: 2022-05-06

## 2022-05-06 ENCOUNTER — NURSE TRIAGE (OUTPATIENT)
Dept: NURSING | Facility: CLINIC | Age: 72
End: 2022-05-06

## 2022-05-06 ENCOUNTER — OFFICE VISIT (OUTPATIENT)
Dept: PEDIATRICS | Facility: CLINIC | Age: 72
End: 2022-05-06
Payer: COMMERCIAL

## 2022-05-06 ENCOUNTER — ANCILLARY PROCEDURE (OUTPATIENT)
Dept: MAMMOGRAPHY | Facility: CLINIC | Age: 72
End: 2022-05-06
Payer: COMMERCIAL

## 2022-05-06 VITALS
TEMPERATURE: 98.6 F | SYSTOLIC BLOOD PRESSURE: 154 MMHG | HEART RATE: 82 BPM | WEIGHT: 185.5 LBS | OXYGEN SATURATION: 97 % | RESPIRATION RATE: 14 BRPM | HEIGHT: 62 IN | DIASTOLIC BLOOD PRESSURE: 92 MMHG | BODY MASS INDEX: 34.14 KG/M2

## 2022-05-06 DIAGNOSIS — I10 ESSENTIAL HYPERTENSION, BENIGN: ICD-10-CM

## 2022-05-06 DIAGNOSIS — E78.5 HYPERLIPIDEMIA LDL GOAL <160: ICD-10-CM

## 2022-05-06 DIAGNOSIS — Z12.31 VISIT FOR SCREENING MAMMOGRAM: ICD-10-CM

## 2022-05-06 DIAGNOSIS — I10 ESSENTIAL HYPERTENSION, BENIGN: Primary | ICD-10-CM

## 2022-05-06 DIAGNOSIS — N95.1 SYMPTOMATIC MENOPAUSAL OR FEMALE CLIMACTERIC STATES: ICD-10-CM

## 2022-05-06 DIAGNOSIS — N93.9 VAGINAL SPOTTING: ICD-10-CM

## 2022-05-06 DIAGNOSIS — E78.2 MIXED HYPERLIPIDEMIA: ICD-10-CM

## 2022-05-06 DIAGNOSIS — R73.01 IMPAIRED FASTING GLUCOSE: ICD-10-CM

## 2022-05-06 DIAGNOSIS — Z00.00 ENCOUNTER FOR MEDICARE ANNUAL WELLNESS EXAM: Primary | ICD-10-CM

## 2022-05-06 DIAGNOSIS — B35.3 TINEA PEDIS, UNSPECIFIED LATERALITY: ICD-10-CM

## 2022-05-06 PROCEDURE — 36415 COLL VENOUS BLD VENIPUNCTURE: CPT

## 2022-05-06 PROCEDURE — 80061 LIPID PANEL: CPT

## 2022-05-06 PROCEDURE — 77067 SCR MAMMO BI INCL CAD: CPT | Mod: TC | Performed by: RADIOLOGY

## 2022-05-06 PROCEDURE — 99397 PER PM REEVAL EST PAT 65+ YR: CPT | Performed by: INTERNAL MEDICINE

## 2022-05-06 PROCEDURE — 99214 OFFICE O/P EST MOD 30 MIN: CPT | Mod: 25 | Performed by: INTERNAL MEDICINE

## 2022-05-06 PROCEDURE — 77063 BREAST TOMOSYNTHESIS BI: CPT | Mod: TC | Performed by: RADIOLOGY

## 2022-05-06 PROCEDURE — 80048 BASIC METABOLIC PNL TOTAL CA: CPT

## 2022-05-06 RX ORDER — ESTROGEN,CON/M-PROGEST ACET 0.3-1.5MG
1 TABLET ORAL DAILY
Qty: 90 TABLET | Refills: 4 | Status: SHIPPED | OUTPATIENT
Start: 2022-05-06 | End: 2023-07-26

## 2022-05-06 RX ORDER — CLONIDINE HYDROCHLORIDE 0.1 MG/1
0.1 TABLET ORAL AT BEDTIME
Qty: 90 TABLET | Refills: 3 | Status: SHIPPED | OUTPATIENT
Start: 2022-05-06 | End: 2023-05-05

## 2022-05-06 RX ORDER — TRIAMTERENE AND HYDROCHLOROTHIAZIDE 37.5; 25 MG/1; MG/1
CAPSULE ORAL
Qty: 90 CAPSULE | Refills: 4 | Status: SHIPPED | OUTPATIENT
Start: 2022-05-06 | End: 2023-05-23

## 2022-05-06 ASSESSMENT — ENCOUNTER SYMPTOMS
WEAKNESS: 0
EYE PAIN: 0
ABDOMINAL PAIN: 0
HEADACHES: 0
MYALGIAS: 0
NERVOUS/ANXIOUS: 0
HEMATURIA: 0
FEVER: 0
HEARTBURN: 0
SHORTNESS OF BREATH: 0
DYSURIA: 0
BREAST MASS: 0
SORE THROAT: 0
PALPITATIONS: 0
DIARRHEA: 0
NAUSEA: 0
DIZZINESS: 0
CHILLS: 0
FREQUENCY: 0
HEMATOCHEZIA: 0
COUGH: 0
ARTHRALGIAS: 0
JOINT SWELLING: 0
CONSTIPATION: 0
PARESTHESIAS: 0

## 2022-05-06 ASSESSMENT — ACTIVITIES OF DAILY LIVING (ADL): CURRENT_FUNCTION: NO ASSISTANCE NEEDED

## 2022-05-06 ASSESSMENT — PAIN SCALES - GENERAL: PAINLEVEL: NO PAIN (0)

## 2022-05-06 NOTE — PROGRESS NOTES
"SUBJECTIVE:   Daria Driscoll is a 72 year old female who presents for Preventive Visit.    Patient has been advised of split billing requirements and indicates understanding: Yes  Are you in the first 12 months of your Medicare coverage?  No    Healthy Habits:     In general, how would you rate your overall health?  Good    Frequency of exercise:  4-5 days/week    Duration of exercise:  15-30 minutes    Do you usually eat at least 4 servings of fruit and vegetables a day, include whole grains    & fiber and avoid regularly eating high fat or \"junk\" foods?  Yes    Taking medications regularly:  Yes    Medication side effects:  None    Ability to successfully perform activities of daily living:  No assistance needed    Home Safety:  No safety concerns identified    Hearing Impairment:  No hearing concerns    In the past 6 months, have you been bothered by leaking of urine?  No    In general, how would you rate your overall mental or emotional health?  Excellent      PHQ-2 Total Score: 0    Additional concerns today:  No  Concerns today: wants to have pap smear - had some spotting    hypertension - BP at home has been good, 129/72 most recently.  Stress with sister who has intellectual disability and has breast cancer    Do you feel safe in your environment? Yes    Have you ever done Advance Care Planning? (For example, a Health Directive, POLST, or a discussion with a medical provider or your loved ones about your wishes): Yes, patient states has an Advance Care Planning document and will bring a copy to the clinic.    Fall risk  Fallen 2 or more times in the past year?: No  Any fall with injury in the past year?: No    Cognitive Screening   1) Repeat 3 items (Leader, Season, Table)    2) Clock draw: NORMAL  3) 3 item recall: Recalls 3 objects  Results: 3 items recalled: COGNITIVE IMPAIRMENT LESS LIKELY    Mini-CogTM Copyright S Raul. Licensed by the author for use in [facility]; reprinted with " permission ([email or web address]). All rights reserved.      Do you have sleep apnea, excessive snoring or daytime drowsiness?: no    Reviewed and updated as needed this visit by clinical staff   Tobacco  Allergies    Med Hx  Surg Hx  Fam Hx  Soc Hx          Reviewed and updated as needed this visit by Provider         Fam Hx           Social History     Tobacco Use     Smoking status: Never Smoker     Smokeless tobacco: Never Used   Substance Use Topics     Alcohol use: No     Alcohol Use 5/6/2022   Prescreen: >3 drinks/day or >7 drinks/week? No   Prescreen: >3 drinks/day or >7 drinks/week? -       Current providers sharing in care for this patient include:   Patient Care Team:  Shena Alejo MD as PCP - General  Shena Alejo MD as Assigned PCP    The following health maintenance items are reviewed in Epic and correct as of today:  Health Maintenance Due   Topic Date Due     DTAP/TDAP/TD IMMUNIZATION (1 - Tdap) 02/12/2016     BMP  06/21/2020     MAMMO SCREENING  06/21/2020     FALL RISK ASSESSMENT  09/25/2021     LIPID  12/02/2021     COVID-19 Vaccine (4 - Booster for Moderna series) 03/04/2022     Labs reviewed in EPIC    Review of Systems   Constitutional: Negative for chills and fever.   HENT: Negative for congestion, ear pain, hearing loss and sore throat.    Eyes: Negative for pain and visual disturbance.   Respiratory: Negative for cough and shortness of breath.    Cardiovascular: Negative for chest pain, palpitations and peripheral edema.   Gastrointestinal: Negative for abdominal pain, constipation, diarrhea, heartburn, hematochezia and nausea.   Breasts:  Negative for tenderness, breast mass and discharge.   Genitourinary: Negative for dysuria, frequency, genital sores, hematuria, pelvic pain, urgency, vaginal bleeding and vaginal discharge.   Musculoskeletal: Negative for arthralgias, joint swelling and myalgias.   Skin: Negative for rash.   Neurological: Negative for dizziness, weakness, headaches and  "paresthesias.   Psychiatric/Behavioral: Negative for mood changes. The patient is not nervous/anxious.        OBJECTIVE:   BP (!) 152/84 (BP Location: Right arm, Cuff Size: Adult Regular)   Pulse 82   Temp 98.6  F (37  C) (Tympanic)   Resp 14   Ht 1.575 m (5' 2\")   Wt 84.1 kg (185 lb 8 oz)   LMP  (LMP Unknown)   SpO2 97%   BMI 33.93 kg/m   Estimated body mass index is 33.93 kg/m  as calculated from the following:    Height as of this encounter: 1.575 m (5' 2\").    Weight as of this encounter: 84.1 kg (185 lb 8 oz).  Physical Exam  GENERAL: healthy, alert and no distress  EYES: Eyes grossly normal to inspection, PERRL and conjunctivae and sclerae normal  HENT: ear canals and TM's normal, nose and mouth without ulcers or lesions  NECK: no adenopathy, no asymmetry, masses, or scars and thyroid normal to palpation  RESP: lungs clear to auscultation - no rales, rhonchi or wheezes  CV: regular rate and rhythm, normal S1 S2, no S3 or S4, no murmur, click or rub, no peripheral edema and peripheral pulses strong  ABDOMEN: soft, nontender, no hepatosplenomegaly, no masses and bowel sounds normal  MS: no gross musculoskeletal defects noted, no edema  SKIN: no suspicious lesions or rashes  NEURO: Normal strength and tone, mentation intact and speech normal  PSYCH: mentation appears normal, affect normal/bright    Diagnostic Test Results:  Labs reviewed in Epic    ASSESSMENT / PLAN:   Encounter for Medicare annual wellness exam  Routine health education discussed: calcium, diet, exercise, weight, safety.     Vaginal spotting  Refer to OB for US and biopsy    Symptomatic menopausal or female climacteric states  Resume clonidine, continue hrt given ongoing symptoms   - cloNIDine (CATAPRES) 0.1 MG tablet; Take 1 tablet (0.1 mg) by mouth At Bedtime  - estrogen conj-medroxyPROGESTERone (PREMPRO) 0.3-1.5 MG tablet; Take 1 tablet by mouth daily    Essential hypertension, benign  Didn't realize had clonidine refills.  Resume and " "notify of BP in a month  - cloNIDine (CATAPRES) 0.1 MG tablet; Take 1 tablet (0.1 mg) by mouth At Bedtime  - triamterene-HCTZ (DYAZIDE) 37.5-25 MG capsule; TAKE 1 CAPSULE BY MOUTH EVERY DAY IN THE MORNING    Tinea pedis, unspecified laterality  Refill - intermittently flares  - terbinafine (LAMISIL) 1 % external gel; Apply to rash on feet twice daily x2 weeks then as needed    Mixed hyperlipidemia  ascvd risk high, start statin for prevention  - atorvastatin (LIPITOR) 20 MG tablet; Take 1 tablet (20 mg) by mouth daily            The 10-year ASCVD risk score (Jordan ALAN Jr., et al., 2013) is: 18.3%    Values used to calculate the score:      Age: 72 years      Sex: Female      Is Non- : No      Diabetic: No      Tobacco smoker: No      Systolic Blood Pressure: 152 mmHg      Is BP treated: Yes      HDL Cholesterol: 39 mg/dL      Total Cholesterol: 194 mg/dL         COUNSELING:  Reviewed preventive health counseling, as reflected in patient instructions    Estimated body mass index is 33.93 kg/m  as calculated from the following:    Height as of this encounter: 1.575 m (5' 2\").    Weight as of this encounter: 84.1 kg (185 lb 8 oz).    Weight management plan: Discussed healthy diet and exercise guidelines    She reports that she has never smoked. She has never used smokeless tobacco.      Appropriate preventive services were discussed with this patient, including applicable screening as appropriate for cardiovascular disease, diabetes, osteopenia/osteoporosis, and glaucoma.  As appropriate for age/gender, discussed screening for colorectal cancer, prostate cancer, breast cancer, and cervical cancer. Checklist reviewing preventive services available has been given to the patient.    Reviewed patients plan of care and provided an AVS. The Basic Care Plan (routine screening as documented in Health Maintenance) for Daria meets the Care Plan requirement. This Care Plan has been established and reviewed " with the Patient.    Counseling Resources:  ATP IV Guidelines  Pooled Cohorts Equation Calculator  Breast Cancer Risk Calculator  Breast Cancer: Medication to Reduce Risk  FRAX Risk Assessment  ICSI Preventive Guidelines  Dietary Guidelines for Americans, 2010  USDA's MyPlate  ASA Prophylaxis  Lung CA Screening    Shena Alejo MD  [facility]    Identified Health Risks:

## 2022-05-06 NOTE — PATIENT INSTRUCTIONS
Patient Education   Personalized Prevention Plan  You are due for the preventive services outlined below.  Your care team is available to assist you in scheduling these services.  If you have already completed any of these items, please share that information with your care team to update in your medical record.  Health Maintenance Due   Topic Date Due    COVID-19 Vaccine (4 - Booster for Moderna series) 03/04/2022     Get your second covid booster    Get a pelvic ultrasound and follow-up with OB/gyn to get an endometrial biopsy given the bleeding.  They will call you to schedule.

## 2022-05-06 NOTE — TELEPHONE ENCOUNTER
The pt called and I spoke to her and due to her travel situation I booked the pt for her labs and mammogram this morning. The pt is aware and needs nothing further.   Mirna De La Vega on 5/6/2022 at 8:43 AM

## 2022-05-06 NOTE — TELEPHONE ENCOUNTER
Triage call:     Has a 1:10 pm appt today    States she has not eaten since 6 pm yesterday  Wondering if she can be seen today for her lab work prior to her appt    States that she would like to have a PAP today as well as a mammogram  - patient states that she would like a mammogram today  Patient has a significant family history of cancer- sister was just diagnosed with breast cancer, patient is very anxious about getting these procedures done  - patient states she is not symptomativ    Patient has questions about her visit today:   Provider please advise if the mammogram would be possible today?   Are you able to put in the orders for her blood work now so she can come to the clinic prior to her appointment and get this drawn?   Is a PAP smear possible to be done during her visit today?   Please advise on patient questions listed above.       Kiara Ramirez RN BSN 5/6/2022 8:29 AM      Additional Information    Negative: Nursing judgment    Nursing judgment    Protocols used: INFORMATION ONLY CALL - NO TRIAGE-A-OH

## 2022-05-06 NOTE — TELEPHONE ENCOUNTER
Dr. Alejo,   Patient requesting fasting lab work be completed prior to clinic visit this afternoon,  I pended lab orders-    Please approve or deny- Thank you!    Jadyn Miller, BSN, RN  [facility]

## 2022-05-07 LAB
ANION GAP SERPL CALCULATED.3IONS-SCNC: 6 MMOL/L (ref 3–14)
BUN SERPL-MCNC: 14 MG/DL (ref 7–30)
CALCIUM SERPL-MCNC: 8.9 MG/DL (ref 8.5–10.1)
CHLORIDE BLD-SCNC: 108 MMOL/L (ref 94–109)
CHOLEST SERPL-MCNC: 196 MG/DL
CO2 SERPL-SCNC: 25 MMOL/L (ref 20–32)
CREAT SERPL-MCNC: 0.76 MG/DL (ref 0.52–1.04)
FASTING STATUS PATIENT QL REPORTED: YES
GFR SERPL CREATININE-BSD FRML MDRD: 84 ML/MIN/1.73M2
GLUCOSE BLD-MCNC: 122 MG/DL (ref 70–99)
HDLC SERPL-MCNC: 34 MG/DL
LDLC SERPL CALC-MCNC: 125 MG/DL
NONHDLC SERPL-MCNC: 162 MG/DL
POTASSIUM BLD-SCNC: 3.9 MMOL/L (ref 3.4–5.3)
SODIUM SERPL-SCNC: 139 MMOL/L (ref 133–144)
TRIGL SERPL-MCNC: 184 MG/DL

## 2022-05-09 DIAGNOSIS — N95.0 PMB (POSTMENOPAUSAL BLEEDING): Primary | ICD-10-CM

## 2022-05-09 RX ORDER — ATORVASTATIN CALCIUM 20 MG/1
20 TABLET, FILM COATED ORAL DAILY
Qty: 90 TABLET | Refills: 3 | Status: SHIPPED | OUTPATIENT
Start: 2022-05-09 | End: 2022-05-31 | Stop reason: SINTOL

## 2022-05-31 ENCOUNTER — TELEPHONE (OUTPATIENT)
Dept: PEDIATRICS | Facility: CLINIC | Age: 72
End: 2022-05-31

## 2022-05-31 NOTE — TELEPHONE ENCOUNTER
Fine to stop atorvastatin.  Her risk of heart attack or stroke is still high even with BP better at 13.1%.  I do recommend trying a cousin of atorvastatin at a lower dose.  If she wants to try red rice yeast extract instead she can do that and we can recheck labs at her next physical and talk about it further then

## 2022-05-31 NOTE — TELEPHONE ENCOUNTER
The pt is aware of the providers message and she has no further questions at this time.   Mirna De La Vega on 5/31/2022 at 12:10 PM

## 2022-05-31 NOTE — TELEPHONE ENCOUNTER
She has been having muscle cramps day and night since she started taking atorvastatin 5/9/22. She almost feels like it is paralyzing for her. She plans to stop the medication and go back to the red rice yeast tablets. She had stopped these for about a year and thinks that is why her cholesterol went up.     She had suspicions this might happen to her. Other family members had the same result.    Her blood pressure has been good as well.     5/9 137/78 76  5/10 135/82 65   5/11 134/80 66  5/12  136/79 75  5/13 127/72 66   5/14 120/70 64  5/15 137/72 66  5/16 125/76 60  5/17 137/75 60  5/18 149/80 64  5/19 136/73 63  5/20 125/75 66  5/21 124/68 62  5/22 124/70 60  5/23 125/70 62    She has been watching calories, salt intake and exercising more.   Melyssa Salazar RN on 5/31/2022 at 8:24 AM

## 2022-06-06 ENCOUNTER — TRANSFERRED RECORDS (OUTPATIENT)
Dept: PEDIATRICS | Facility: CLINIC | Age: 72
End: 2022-06-06

## 2022-06-06 LAB — HEMOCCULT STL QL IA: NEGATIVE

## 2022-06-08 ENCOUNTER — ANCILLARY PROCEDURE (OUTPATIENT)
Dept: ULTRASOUND IMAGING | Facility: CLINIC | Age: 72
End: 2022-06-08
Attending: INTERNAL MEDICINE
Payer: COMMERCIAL

## 2022-06-08 ENCOUNTER — OFFICE VISIT (OUTPATIENT)
Dept: OBGYN | Facility: CLINIC | Age: 72
End: 2022-06-08
Payer: COMMERCIAL

## 2022-06-08 VITALS — BODY MASS INDEX: 33.84 KG/M2 | DIASTOLIC BLOOD PRESSURE: 70 MMHG | WEIGHT: 185 LBS | SYSTOLIC BLOOD PRESSURE: 142 MMHG

## 2022-06-08 DIAGNOSIS — N95.0 POST-MENOPAUSAL BLEEDING: Primary | ICD-10-CM

## 2022-06-08 DIAGNOSIS — N95.0 PMB (POSTMENOPAUSAL BLEEDING): ICD-10-CM

## 2022-06-08 PROCEDURE — 58100 BIOPSY OF UTERUS LINING: CPT | Performed by: OBSTETRICS & GYNECOLOGY

## 2022-06-08 PROCEDURE — 76856 US EXAM PELVIC COMPLETE: CPT | Performed by: OBSTETRICS & GYNECOLOGY

## 2022-06-08 PROCEDURE — 76830 TRANSVAGINAL US NON-OB: CPT | Performed by: OBSTETRICS & GYNECOLOGY

## 2022-06-08 PROCEDURE — 88305 TISSUE EXAM BY PATHOLOGIST: CPT | Performed by: PATHOLOGY

## 2022-06-08 NOTE — PROGRESS NOTES
INDICATIONS:  PMB and hyperechoic lesion within uterine cavity.  Patient on Edgar pro                                                    Is a pregnancy test required: No.  Was a consent obtained?  Yes    Having endometrial biopsy for post-menopausal bleeding and lesion on U/S     Patient had U/S just prior to today's visit.  Findings reviewed.       CLINICAL INFORMATION     Indications for ultrasound:   Bleeding/Menses - Post daylin bleeding  Hx polyps     LMP: post daylin     Hormones: HRT   Measurements:  Uterus:  6.4 x 4.5 x 3.4  cm     Position is retroverted.  Contour is smooth/regular.     Endo cav: 11.3 mm       Hyperechoic area 0.8 x 0.7cm     Right ovary: 3.2 x 2.0 x 1.9 cm  Wnl  Left ovary:   2.2 x 1.3 x 1.4  cm Wnl     Cul de sac: free fluid - 0.7 x 1.1 cm     ===================================  Impression:     Complete pelvic ultrasound using realtime   transabdominal and transvaginal scanning     Bladder appears normal      Normal-sized uterus.  Uterus is retroverted.     Endometrium noted to be prominent and heterogeneous.  Endometrium with intracavitary hyperechoic area: polyp versus submucous myoma.     Normal bilateral ovaries      Trace cul-de-sac fluid.     Consider endometrial sampling to rule out endometrial neoplasm, hyperplasia, or polyp.     Consider hysteroscopy if endometrial sampling is unrevealing.       Patient has had 2 prior hysteroscopy/D&Cs 2004 and 2014 with benign findings.  Is on Prempro for vasomotor symptoms and finds it very helpful    Today's PHQ-2 Score:   PHQ-2 ( 1999 Pfizer) 5/6/2022   Q1: Little interest or pleasure in doing things 0   Q2: Feeling down, depressed or hopeless 0   PHQ-2 Score 0   PHQ-2 Total Score (12-17 Years)- Positive if 3 or more points; Administer PHQ-A if positive -   Q1: Little interest or pleasure in doing things Not at all   Q2: Feeling down, depressed or hopeless Not at all   PHQ-2 Score 0       PROCEDURE;                                                       A speculum was placed in the vagina and cervix prepped with betadine. A tenaculum was attached to the cervix. A small plastic 5 mm Pipelle syringe curette was inserted into the cervical canal. The uterus was sounded to 7 cm's. A vigorous four quadrant biopsy was performed, removing amount scant  of tissue. The speculum was removed. This tissue was placed in Formalin and sent to pathology.    The patient tolerated the procedure  fairly well and she reported there was  cramping.      POST PROCEDURE;                                                      There  was no cramping at the time of discharge. She  tolerated the procedure well with minimal discomfort. There were no complications. Patient was discharged in stable condition.    Patient advised to call the clinic if severe pelvic pain, fever or heavy bleeding.    We discussed that a hysteroscopy/D&C will likely be advised unless pathology concerning for malignancy    Corby Augustine MD

## 2022-06-10 LAB
PATH REPORT.COMMENTS IMP SPEC: NORMAL
PATH REPORT.FINAL DX SPEC: NORMAL
PATH REPORT.GROSS SPEC: NORMAL
PATH REPORT.MICROSCOPIC SPEC OTHER STN: NORMAL
PATH REPORT.RELEVANT HX SPEC: NORMAL
PHOTO IMAGE: NORMAL

## 2022-08-18 ENCOUNTER — TELEPHONE (OUTPATIENT)
Dept: PEDIATRICS | Facility: CLINIC | Age: 72
End: 2022-08-18

## 2022-08-18 NOTE — TELEPHONE ENCOUNTER
FYI - Status Update    Who is Calling: patient    Update: Patient reporting that she was told her Prempro is not available to refill until October (patient last filled Prempro on 5-23-22 for 3 boxes/#28 pills), this writer attempted to contact Medicare (1-800-MEDICARE) and [facility] ([phone], ID# [id number], grp# [id number]) insurances but was unsuccessful in reaching anyone that could tell this writer why medication wasn't available until October, also attempted to contact pharmacy but was on hold over 31 minutes, patient notified that this writer exhausted all resources so patient will have to contact pharmacy again to have them help her figure out why she is unable to refill Prempro until October, patient agrees with plan, will call back as needed.    Does caller want a call/response back: Yes     Okay to leave a detailed message?: Yes at Cell number on file:    Telephone Information:   Mobile [phone]

## 2022-08-19 NOTE — TELEPHONE ENCOUNTER
Patient called the pharmacy. They said that patient's prescription was filled.     Jadyn Nunez RN on 8/19/2022 at 9:09 AM

## 2023-04-20 ENCOUNTER — PATIENT OUTREACH (OUTPATIENT)
Dept: CARE COORDINATION | Facility: CLINIC | Age: 73
End: 2023-04-20
Payer: COMMERCIAL

## 2023-05-04 ENCOUNTER — PATIENT OUTREACH (OUTPATIENT)
Dept: CARE COORDINATION | Facility: CLINIC | Age: 73
End: 2023-05-04
Payer: COMMERCIAL

## 2023-05-22 DIAGNOSIS — I10 ESSENTIAL HYPERTENSION, BENIGN: ICD-10-CM

## 2023-05-23 RX ORDER — TRIAMTERENE AND HYDROCHLOROTHIAZIDE 37.5; 25 MG/1; MG/1
CAPSULE ORAL
Qty: 90 CAPSULE | Refills: 0 | Status: SHIPPED | OUTPATIENT
Start: 2023-05-23 | End: 2023-07-26

## 2023-05-23 NOTE — TELEPHONE ENCOUNTER
Routing refill request to provider for review/approval because:  Labs not current:  BMP  Patient needs to be seen because it has been more than 1 year since last office visit.  Failing bp    Gale Law RN

## 2023-07-22 DIAGNOSIS — N95.1 SYMPTOMATIC MENOPAUSAL OR FEMALE CLIMACTERIC STATES: ICD-10-CM

## 2023-07-22 DIAGNOSIS — I10 ESSENTIAL HYPERTENSION, BENIGN: ICD-10-CM

## 2023-07-26 RX ORDER — ESTROGEN,CON/M-PROGEST ACET 0.3-1.5MG
TABLET ORAL
Qty: 84 TABLET | Refills: 0 | Status: SHIPPED | OUTPATIENT
Start: 2023-07-26 | End: 2023-09-08

## 2023-07-26 RX ORDER — TRIAMTERENE AND HYDROCHLOROTHIAZIDE 37.5; 25 MG/1; MG/1
CAPSULE ORAL
Qty: 90 CAPSULE | Refills: 0 | Status: SHIPPED | OUTPATIENT
Start: 2023-07-26 | End: 2023-09-08

## 2023-07-26 RX ORDER — CLONIDINE HYDROCHLORIDE 0.1 MG/1
0.1 TABLET ORAL AT BEDTIME
Qty: 90 TABLET | Refills: 0 | Status: SHIPPED | OUTPATIENT
Start: 2023-07-26 | End: 2023-09-08

## 2023-07-26 NOTE — TELEPHONE ENCOUNTER
Routing refill request to provider for review/approval because:  Labs not current:  Creatinine, potassium, sodium  Patient needs to be seen because it has been more than 1 year since last office visit.  BP readings out of range.   BP Readings from Last 3 Encounters:   06/08/22 (!) 142/70   05/06/22 (!) 154/92   09/25/20 129/79     Janeen SNYDER RN   PAL (Patient Advocate Liaison)  [facility]

## 2023-09-06 ENCOUNTER — ANCILLARY ORDERS (OUTPATIENT)
Dept: PEDIATRICS | Facility: CLINIC | Age: 73
End: 2023-09-06

## 2023-09-06 DIAGNOSIS — Z12.31 VISIT FOR SCREENING MAMMOGRAM: Primary | ICD-10-CM

## 2023-09-08 ENCOUNTER — ANCILLARY PROCEDURE (OUTPATIENT)
Dept: MAMMOGRAPHY | Facility: CLINIC | Age: 73
End: 2023-09-08
Payer: COMMERCIAL

## 2023-09-08 ENCOUNTER — OFFICE VISIT (OUTPATIENT)
Dept: PEDIATRICS | Facility: CLINIC | Age: 73
End: 2023-09-08
Payer: COMMERCIAL

## 2023-09-08 VITALS
RESPIRATION RATE: 18 BRPM | HEART RATE: 67 BPM | WEIGHT: 172.9 LBS | DIASTOLIC BLOOD PRESSURE: 72 MMHG | OXYGEN SATURATION: 98 % | HEIGHT: 62 IN | SYSTOLIC BLOOD PRESSURE: 124 MMHG | BODY MASS INDEX: 31.82 KG/M2 | TEMPERATURE: 97.4 F

## 2023-09-08 DIAGNOSIS — Z00.00 ENCOUNTER FOR MEDICARE ANNUAL WELLNESS EXAM: Primary | ICD-10-CM

## 2023-09-08 DIAGNOSIS — Z12.11 SCREEN FOR COLON CANCER: ICD-10-CM

## 2023-09-08 DIAGNOSIS — I10 ESSENTIAL HYPERTENSION, BENIGN: ICD-10-CM

## 2023-09-08 DIAGNOSIS — N95.1 SYMPTOMATIC MENOPAUSAL OR FEMALE CLIMACTERIC STATES: ICD-10-CM

## 2023-09-08 DIAGNOSIS — Z12.31 VISIT FOR SCREENING MAMMOGRAM: ICD-10-CM

## 2023-09-08 DIAGNOSIS — E78.2 MIXED HYPERLIPIDEMIA: ICD-10-CM

## 2023-09-08 LAB
ANION GAP SERPL CALCULATED.3IONS-SCNC: 13 MMOL/L (ref 7–15)
BUN SERPL-MCNC: 11.3 MG/DL (ref 8–23)
CALCIUM SERPL-MCNC: 9.2 MG/DL (ref 8.8–10.2)
CHLORIDE SERPL-SCNC: 102 MMOL/L (ref 98–107)
CHOLEST SERPL-MCNC: 206 MG/DL
CREAT SERPL-MCNC: 0.83 MG/DL (ref 0.51–0.95)
DEPRECATED HCO3 PLAS-SCNC: 23 MMOL/L (ref 22–29)
EGFRCR SERPLBLD CKD-EPI 2021: 75 ML/MIN/1.73M2
GLUCOSE SERPL-MCNC: 108 MG/DL (ref 70–99)
HDLC SERPL-MCNC: 43 MG/DL
LDLC SERPL CALC-MCNC: 139 MG/DL
NONHDLC SERPL-MCNC: 163 MG/DL
POTASSIUM SERPL-SCNC: 4 MMOL/L (ref 3.4–5.3)
SODIUM SERPL-SCNC: 138 MMOL/L (ref 136–145)
TRIGL SERPL-MCNC: 119 MG/DL

## 2023-09-08 PROCEDURE — 80061 LIPID PANEL: CPT | Performed by: INTERNAL MEDICINE

## 2023-09-08 PROCEDURE — G0439 PPPS, SUBSEQ VISIT: HCPCS | Performed by: INTERNAL MEDICINE

## 2023-09-08 PROCEDURE — 80048 BASIC METABOLIC PNL TOTAL CA: CPT | Performed by: INTERNAL MEDICINE

## 2023-09-08 PROCEDURE — 77067 SCR MAMMO BI INCL CAD: CPT | Mod: TC | Performed by: RADIOLOGY

## 2023-09-08 PROCEDURE — 36415 COLL VENOUS BLD VENIPUNCTURE: CPT | Performed by: INTERNAL MEDICINE

## 2023-09-08 PROCEDURE — 99214 OFFICE O/P EST MOD 30 MIN: CPT | Mod: 25 | Performed by: INTERNAL MEDICINE

## 2023-09-08 PROCEDURE — G0008 ADMIN INFLUENZA VIRUS VAC: HCPCS | Performed by: INTERNAL MEDICINE

## 2023-09-08 PROCEDURE — 90662 IIV NO PRSV INCREASED AG IM: CPT | Performed by: INTERNAL MEDICINE

## 2023-09-08 RX ORDER — TRIAMTERENE AND HYDROCHLOROTHIAZIDE 37.5; 25 MG/1; MG/1
1 CAPSULE ORAL EVERY MORNING
Qty: 90 CAPSULE | Refills: 3 | Status: SHIPPED | OUTPATIENT
Start: 2023-09-08 | End: 2024-09-17

## 2023-09-08 RX ORDER — CLONIDINE HYDROCHLORIDE 0.1 MG/1
0.1 TABLET ORAL AT BEDTIME
Qty: 90 TABLET | Refills: 3 | Status: SHIPPED | OUTPATIENT
Start: 2023-09-08 | End: 2024-09-17

## 2023-09-08 RX ORDER — ESTROGEN,CON/M-PROGEST ACET 0.3-1.5MG
1 TABLET ORAL DAILY
Qty: 84 TABLET | Refills: 3 | Status: SHIPPED | OUTPATIENT
Start: 2023-09-08 | End: 2024-09-17

## 2023-09-08 SDOH — ECONOMIC STABILITY: INCOME INSECURITY: IN THE LAST 12 MONTHS, WAS THERE A TIME WHEN YOU WERE NOT ABLE TO PAY THE MORTGAGE OR RENT ON TIME?: NO

## 2023-09-08 SDOH — ECONOMIC STABILITY: FOOD INSECURITY: WITHIN THE PAST 12 MONTHS, THE FOOD YOU BOUGHT JUST DIDN'T LAST AND YOU DIDN'T HAVE MONEY TO GET MORE.: NEVER TRUE

## 2023-09-08 SDOH — ECONOMIC STABILITY: TRANSPORTATION INSECURITY
IN THE PAST 12 MONTHS, HAS LACK OF TRANSPORTATION KEPT YOU FROM MEETINGS, WORK, OR FROM GETTING THINGS NEEDED FOR DAILY LIVING?: NO

## 2023-09-08 SDOH — ECONOMIC STABILITY: TRANSPORTATION INSECURITY
IN THE PAST 12 MONTHS, HAS THE LACK OF TRANSPORTATION KEPT YOU FROM MEDICAL APPOINTMENTS OR FROM GETTING MEDICATIONS?: NO

## 2023-09-08 SDOH — ECONOMIC STABILITY: INCOME INSECURITY: HOW HARD IS IT FOR YOU TO PAY FOR THE VERY BASICS LIKE FOOD, HOUSING, MEDICAL CARE, AND HEATING?: NOT HARD AT ALL

## 2023-09-08 SDOH — HEALTH STABILITY: PHYSICAL HEALTH: ON AVERAGE, HOW MANY MINUTES DO YOU ENGAGE IN EXERCISE AT THIS LEVEL?: 30 MIN

## 2023-09-08 SDOH — HEALTH STABILITY: PHYSICAL HEALTH: ON AVERAGE, HOW MANY DAYS PER WEEK DO YOU ENGAGE IN MODERATE TO STRENUOUS EXERCISE (LIKE A BRISK WALK)?: 3 DAYS

## 2023-09-08 SDOH — ECONOMIC STABILITY: FOOD INSECURITY: WITHIN THE PAST 12 MONTHS, YOU WORRIED THAT YOUR FOOD WOULD RUN OUT BEFORE YOU GOT MONEY TO BUY MORE.: NEVER TRUE

## 2023-09-08 ASSESSMENT — ENCOUNTER SYMPTOMS
JOINT SWELLING: 0
FEVER: 0
HEMATOCHEZIA: 0
HEMATURIA: 0
CONSTIPATION: 0
SHORTNESS OF BREATH: 0
COUGH: 0
WEAKNESS: 0
PARESTHESIAS: 0
EYE PAIN: 0
ABDOMINAL PAIN: 0
CHILLS: 0
NAUSEA: 0
BREAST MASS: 0
HEARTBURN: 0
PALPITATIONS: 0
ARTHRALGIAS: 0
DIARRHEA: 0
DYSURIA: 0
FREQUENCY: 0
MYALGIAS: 0
DIZZINESS: 0
HEADACHES: 0
NERVOUS/ANXIOUS: 0
SORE THROAT: 0

## 2023-09-08 ASSESSMENT — SOCIAL DETERMINANTS OF HEALTH (SDOH)
IN A TYPICAL WEEK, HOW MANY TIMES DO YOU TALK ON THE PHONE WITH FAMILY, FRIENDS, OR NEIGHBORS?: MORE THAN THREE TIMES A WEEK
HOW OFTEN DO YOU ATTEND CHURCH OR RELIGIOUS SERVICES?: MORE THAN 4 TIMES PER YEAR
DO YOU BELONG TO ANY CLUBS OR ORGANIZATIONS SUCH AS CHURCH GROUPS UNIONS, FRATERNAL OR ATHLETIC GROUPS, OR SCHOOL GROUPS?: YES
HOW OFTEN DO YOU GET TOGETHER WITH FRIENDS OR RELATIVES?: MORE THAN THREE TIMES A WEEK

## 2023-09-08 ASSESSMENT — LIFESTYLE VARIABLES
AUDIT-C TOTAL SCORE: 0
HOW OFTEN DO YOU HAVE A DRINK CONTAINING ALCOHOL: NEVER
HOW OFTEN DO YOU HAVE SIX OR MORE DRINKS ON ONE OCCASION: NEVER
HOW MANY STANDARD DRINKS CONTAINING ALCOHOL DO YOU HAVE ON A TYPICAL DAY: PATIENT DOES NOT DRINK
SKIP TO QUESTIONS 9-10: 1

## 2023-09-08 ASSESSMENT — PAIN SCALES - GENERAL: PAINLEVEL: NO PAIN (0)

## 2023-09-08 ASSESSMENT — ACTIVITIES OF DAILY LIVING (ADL): CURRENT_FUNCTION: NO ASSISTANCE NEEDED

## 2023-09-08 NOTE — PROGRESS NOTES
"SUBJECTIVE:   Daria is a 73 year old who presents for Preventive Visit.      9/8/2023     7:49 AM   Additional Questions   Roomed by Aviva Gray CMA       Are you in the first 12 months of your Medicare coverage?  No    Healthy Habits:     In general, how would you rate your overall health?  Good    Frequency of exercise:  2-3 days/week    Duration of exercise:  15-30 minutes    Do you usually eat at least 4 servings of fruit and vegetables a day, include whole grains    & fiber and avoid regularly eating high fat or \"junk\" foods?  Yes    Taking medications regularly:  Yes    Medication side effects:  None    Ability to successfully perform activities of daily living:  No assistance needed    Home Safety:  No safety concerns identified    Hearing Impairment:  No hearing concerns    In the past 6 months, have you been bothered by leaking of urine?  No    In general, how would you rate your overall mental or emotional health?  Excellent    Additional concerns today:  No    Have you ever done Advance Care Planning? (For example, a Health Directive, POLST, or a discussion with a medical provider or your loved ones about your wishes): No, advance care planning information given to patient to review.  Patient declined advance care planning discussion at this time.    Fall risk  Fallen 2 or more times in the past year?: No  Any fall with injury in the past year?: No    Cognitive Screening   1) Repeat 3 items (Leader, Season, Table)    2) Clock draw: NORMAL  3) 3 item recall: Recalls 3 objects  Results: 3 items recalled: COGNITIVE IMPAIRMENT LESS LIKELY    Mini-CogTM Copyright S Raul. Licensed by the author for use in [facility]; reprinted with permission ([email or web address]@.[email or web address]). All rights reserved.      Do you have sleep apnea, excessive snoring or daytime drowsiness? : no    Reviewed and updated as needed this visit by clinical staff   Tobacco  Allergies  Meds  Problems             Reviewed and updated as " needed this visit by Provider    Allergies  Meds  Problems            Social History     Tobacco Use    Smoking status: Never    Smokeless tobacco: Never   Substance Use Topics    Alcohol use: No             9/8/2023     7:45 AM   Alcohol Use   Prescreen: >3 drinks/day or >7 drinks/week? No     Do you have a current opioid prescription? No  Do you use any other controlled substances or medications that are not prescribed by a provider? None        Current providers sharing in care for this patient include:   Patient Care Team:  Shena Aljeo MD as PCP - General  Shena Alejo MD as Assigned PCP  Corby Augustine MD as Assigned OBGYN Provider    The following health maintenance items are reviewed in Epic and correct as of today:  Health Maintenance   Topic Date Due    BMP  05/06/2023    LIPID  05/06/2023    MAMMO SCREENING  05/06/2023    COVID-19 Vaccine (5 - Moderna series) 05/26/2023    COLORECTAL CANCER SCREENING  06/06/2023    INFLUENZA VACCINE (1) 09/01/2023    MEDICARE ANNUAL WELLNESS VISIT  09/08/2024    ANNUAL REVIEW OF HM ORDERS  09/08/2024    FALL RISK ASSESSMENT  09/08/2024    DTAP/TDAP/TD IMMUNIZATION (3 - Td or Tdap) 02/12/2026    ADVANCE CARE PLANNING  09/08/2028    DEXA  05/19/2032    HEPATITIS C SCREENING  Completed    PHQ-2 (once per calendar year)  Completed    Pneumococcal Vaccine: 65+ Years  Completed    ZOSTER IMMUNIZATION  Completed    IPV IMMUNIZATION  Aged Out    HPV IMMUNIZATION  Aged Out    MENINGITIS IMMUNIZATION  Aged Out       Review of Systems   Constitutional:  Negative for chills and fever.   HENT:  Negative for congestion, ear pain, hearing loss and sore throat.    Eyes:  Negative for pain and visual disturbance.   Respiratory:  Negative for cough and shortness of breath.    Cardiovascular:  Negative for chest pain, palpitations and peripheral edema.   Gastrointestinal:  Negative for abdominal pain, constipation, diarrhea, heartburn, hematochezia and nausea.   Breasts:   "Negative for tenderness, breast mass and discharge.   Genitourinary:  Negative for dysuria, frequency, genital sores, hematuria, pelvic pain, urgency, vaginal bleeding and vaginal discharge.   Musculoskeletal:  Negative for arthralgias, joint swelling and myalgias.   Skin:  Negative for rash.   Neurological:  Negative for dizziness, weakness, headaches and paresthesias.   Psychiatric/Behavioral:  Negative for mood changes. The patient is not nervous/anxious.        OBJECTIVE:   /72 (BP Location: Right arm, Cuff Size: Adult Large)   Pulse 67   Temp 97.4  F (36.3  C) (Tympanic)   Resp 18   Ht 1.575 m (5' 2\")   Wt 78.4 kg (172 lb 14.4 oz)   LMP  (LMP Unknown)   SpO2 98%   BMI 31.62 kg/m   Estimated body mass index is 31.62 kg/m  as calculated from the following:    Height as of this encounter: 1.575 m (5' 2\").    Weight as of this encounter: 78.4 kg (172 lb 14.4 oz).  Physical Exam  GENERAL: healthy, alert and no distress  EYES: Eyes grossly normal to inspection, PERRL and conjunctivae and sclerae normal  HENT: ear canals and TM's normal, nose and mouth without ulcers or lesions  NECK: no adenopathy, no asymmetry, masses, or scars and thyroid normal to palpation  RESP: lungs clear to auscultation - no rales, rhonchi or wheezes  CV: regular rate and rhythm, normal S1 S2, no S3 or S4, no murmur, click or rub, no peripheral edema and peripheral pulses strong  ABDOMEN: soft, nontender, no hepatosplenomegaly, no masses and bowel sounds normal  MS: no gross musculoskeletal defects noted, no edema  SKIN: no suspicious lesions or rashes  PSYCH: mentation appears normal, affect normal/bright    Diagnostic Test Results:  Labs reviewed in Epic      ASSESSMENT / PLAN:   Daria was seen today for medicare visit.    Diagnoses and all orders for this visit:    Encounter for Medicare annual wellness exam    Essential hypertension, benign  BP well-controlled at 124/72. No falls, headaches, dizziness, or symptoms of " hypotension. Continue current dose of triamterene-hydrochlorothiazide and clonidine.  -     BASIC METABOLIC PANEL; Future  -     cloNIDine (CATAPRES) 0.1 MG tablet; Take 1 tablet (0.1 mg) by mouth At Bedtime  -     triamterene-HCTZ (DYAZIDE) 37.5-25 MG capsule; Take 1 capsule by mouth every morning  -     BASIC METABOLIC PANEL    Mixed hyperlipidemia  The 10-year ASCVD risk score (Tenisha PORTILLO, et al., 2019) is: 14%    Values used to calculate the score:      Age: 73 years      Sex: Female      Is Non- : No      Diabetic: No      Tobacco smoker: No      Systolic Blood Pressure: 124 mmHg      Is BP treated: Yes      HDL Cholesterol: 34 mg/dL      Total Cholesterol: 196 mg/dL  Current ASCVD risk score is at 14%. Had side effects with atorvastatin so stopped.  Begin taking a moderate-intensity statin rosuvastatin 5 milligram weekly and gradually increase as able  -     Lipid panel reflex to direct LDL Non-fasting; Future  -     Lipid panel reflex to direct LDL Non-fasting    Prediabetes  Previous fasting glucose (May 2022) elevated at 122. She declined starting Metformin last year, preferred to try lifestyle changes. If fasting glucose is > 126 today, consider starting Metformin in addition to lifestyle changes.  -     BASIC METABOLIC PANEL    Screen for colon cancer  -     Fecal colorectal cancer screen FIT - Future (S+30); Future  -     Fecal colorectal cancer screen FIT - Future (S+30)    Symptomatic menopausal or female climacteric states  Experiences occasional hot flashes at night, often relieved by drinking water and removing blankets. Continue HRT to manage these symptoms.   -     cloNIDine (CATAPRES) 0.1 MG tablet; Take 1 tablet (0.1 mg) by mouth At Bedtime  -     estrogen conj-medroxyPROGESTERone (PREMPRO) 0.3-1.5 MG tablet; Take 1 tablet by mouth daily      Other orders  -     REVIEW OF HEALTH MAINTENANCE PROTOCOL ORDERS  -     INFLUENZA VACCINE 65+ (FLUZONE HD)  -     PRIMARY CARE  "FOLLOW-UP SCHEDULING; Future      Patient has been advised of split billing requirements and indicates understanding: Yes      COUNSELING:  Reviewed preventive health counseling, as reflected in patient instructions       Regular exercise       Healthy diet/nutrition       Fall risk prevention       Colon cancer screening      BMI:   Estimated body mass index is 31.62 kg/m  as calculated from the following:    Height as of this encounter: 1.575 m (5' 2\").    Weight as of this encounter: 78.4 kg (172 lb 14.4 oz).   Weight management plan: Discussed healthy diet and exercise guidelines      She reports that she has never smoked. She has never used smokeless tobacco.      Appropriate preventive services were discussed with this patient, including applicable screening as appropriate for cardiovascular disease, diabetes, osteopenia/osteoporosis, and glaucoma.  As appropriate for age/gender, discussed screening for colorectal cancer, prostate cancer, breast cancer, and cervical cancer. Checklist reviewing preventive services available has been given to the patient.    Reviewed patients plan of care and provided an AVS. The Basic Care Plan (routine screening as documented in Health Maintenance) for Daria meets the Care Plan requirement. This Care Plan has been established and reviewed with the Patient.          Deanne Pugh, MS3  [facility] [employer]    Physician Attestation   I, Shena Alejo MD, was present with the medical/KARLA student who participated in the service and in the documentation of the note.  I have verified the history and personally performed the physical exam and medical decision making.  I agree with the assessment and plan of care as documented in the note.      Items personally reviewed: vitals, labs, history and exam.    Shena Alejo MD     [facility]    Identified Health Risks:  I have reviewed Opioid Use Disorder and Substance Use Disorder risk factors " and made any needed referrals.

## 2023-09-08 NOTE — PATIENT INSTRUCTIONS
Patient Education   Personalized Prevention Plan  You are due for the preventive services outlined below.  Your care team is available to assist you in scheduling these services.  If you have already completed any of these items, please share that information with your care team to update in your medical record.  Health Maintenance Due   Topic Date Due     Basic Metabolic Panel  05/06/2023     Cholesterol Lab  05/06/2023     ANNUAL REVIEW OF HM ORDERS  05/06/2023     Mammogram  05/06/2023     COVID-19 Vaccine (5 - Moderna series) 05/26/2023     Colorectal Cancer Screening  06/06/2023     Flu Vaccine (1) 09/01/2023

## 2023-09-08 NOTE — LETTER
September 11, 2023      Daria Driscoll  [address]  [facility] [address]        Dear ,    Your electrolytes and kidney function are normal.  Your sugar level is a little high but much better than last year - please try to work on staying physically active and eating healthily.  Specifically, avoiding simple carbohydrates and focusing on getting lots of fruits and vegetables, protein and whole grains can help prevent diabetes.     Your cholesterol is still really high despite the red rice yeast extract.  Your 10-year ASCVD risk score (risk of heart attack or stroke in the next 10 years) is 13.7%.  I would like you to try a different statin called crestor.  It has fewer side effects.  I would like you to start slowly and take only one pill a week.  If you feel ok in a month, then add a second pill in the week and if ok in another month then add a third pill and gradually increase until you get side effects or are taking one pill daily.  If you get side effects, stop taking it for a week and start back at the level that had been ok before.  Let me know if you have questions about this plan.     Hope you have a great year.  Please let us know if you have any questions or concerns.        Shena Alejo MD     Resulted Orders   BASIC METABOLIC PANEL   Result Value Ref Range    Sodium 138 136 - 145 mmol/L    Potassium 4.0 3.4 - 5.3 mmol/L    Chloride 102 98 - 107 mmol/L    Carbon Dioxide (CO2) 23 22 - 29 mmol/L    Anion Gap 13 7 - 15 mmol/L    Urea Nitrogen 11.3 8.0 - 23.0 mg/dL    Creatinine 0.83 0.51 - 0.95 mg/dL    Calcium 9.2 8.8 - 10.2 mg/dL    Glucose 108 (H) 70 - 99 mg/dL    GFR Estimate 75 >60 mL/min/1.73m2   Lipid panel reflex to direct LDL Non-fasting   Result Value Ref Range    Cholesterol 206 (H) <200 mg/dL    Triglycerides 119 <150 mg/dL    Direct Measure HDL 43 (L) >=50 mg/dL    LDL Cholesterol Calculated 139 (H) <=100 mg/dL    Non HDL Cholesterol 163 (H) <130 mg/dL    Narrative     Cholesterol  Desirable:  <200 mg/dL    Triglycerides  Normal:  Less than 150 mg/dL  Borderline High:  150-199 mg/dL  High:  200-499 mg/dL  Very High:  Greater than or equal to 500 mg/dL    Direct Measure HDL  Female:  Greater than or equal to 50 mg/dL   Male:  Greater than or equal to 40 mg/dL    LDL Cholesterol  Desirable:  <100mg/dL  Above Desirable:  100-129 mg/dL   Borderline High:  130-159 mg/dL   High:  160-189 mg/dL   Very High:  >= 190 mg/dL    Non HDL Cholesterol  Desirable:  130 mg/dL  Above Desirable:  130-159 mg/dL  Borderline High:  160-189 mg/dL  High:  190-219 mg/dL  Very High:  Greater than or equal to 220 mg/dL

## 2023-09-08 NOTE — LETTER
December 13, 2023      Daria Driscoll  [address]  [facility] [address]        Your stool test showed no sign of blood.  It should be repeated yearly until you are 77.  Thanks for completing this important test.      Hope you have a happy holidays!     Shena Alejo MD     Resulted Orders   BASIC METABOLIC PANEL   Result Value Ref Range    Sodium 138 136 - 145 mmol/L    Potassium 4.0 3.4 - 5.3 mmol/L    Chloride 102 98 - 107 mmol/L    Carbon Dioxide (CO2) 23 22 - 29 mmol/L    Anion Gap 13 7 - 15 mmol/L    Urea Nitrogen 11.3 8.0 - 23.0 mg/dL    Creatinine 0.83 0.51 - 0.95 mg/dL    Calcium 9.2 8.8 - 10.2 mg/dL    Glucose 108 (H) 70 - 99 mg/dL    GFR Estimate 75 >60 mL/min/1.73m2   Lipid panel reflex to direct LDL Non-fasting   Result Value Ref Range    Cholesterol 206 (H) <200 mg/dL    Triglycerides 119 <150 mg/dL    Direct Measure HDL 43 (L) >=50 mg/dL    LDL Cholesterol Calculated 139 (H) <=100 mg/dL    Non HDL Cholesterol 163 (H) <130 mg/dL    Narrative    Cholesterol  Desirable:  <200 mg/dL    Triglycerides  Normal:  Less than 150 mg/dL  Borderline High:  150-199 mg/dL  High:  200-499 mg/dL  Very High:  Greater than or equal to 500 mg/dL    Direct Measure HDL  Female:  Greater than or equal to 50 mg/dL   Male:  Greater than or equal to 40 mg/dL    LDL Cholesterol  Desirable:  <100mg/dL  Above Desirable:  100-129 mg/dL   Borderline High:  130-159 mg/dL   High:  160-189 mg/dL   Very High:  >= 190 mg/dL    Non HDL Cholesterol  Desirable:  130 mg/dL  Above Desirable:  130-159 mg/dL  Borderline High:  160-189 mg/dL  High:  190-219 mg/dL  Very High:  Greater than or equal to 220 mg/dL   Fecal colorectal cancer screen FIT - Future (S+30)   Result Value Ref Range    Occult Blood Screen FIT Negative Negative       If you have any questions or concerns, please call the clinic at the number listed above.       Sincerely,      Shena Alejo MD

## 2023-09-11 RX ORDER — ROSUVASTATIN CALCIUM 5 MG/1
5 TABLET, COATED ORAL DAILY
Qty: 90 TABLET | Refills: 3 | Status: SHIPPED | OUTPATIENT
Start: 2023-09-11 | End: 2024-08-30

## 2023-12-10 PROCEDURE — 82274 ASSAY TEST FOR BLOOD FECAL: CPT | Performed by: INTERNAL MEDICINE

## 2023-12-12 LAB — HEMOCCULT STL QL IA: NEGATIVE

## 2024-01-23 ENCOUNTER — VIRTUAL VISIT (OUTPATIENT)
Dept: PEDIATRICS | Facility: CLINIC | Age: 74
End: 2024-01-23
Payer: COMMERCIAL

## 2024-01-23 ENCOUNTER — TELEPHONE (OUTPATIENT)
Dept: PEDIATRICS | Facility: CLINIC | Age: 74
End: 2024-01-23

## 2024-01-23 DIAGNOSIS — Z78.0 POSTMENOPAUSAL: ICD-10-CM

## 2024-01-23 DIAGNOSIS — I10 ESSENTIAL HYPERTENSION, BENIGN: Primary | ICD-10-CM

## 2024-01-23 DIAGNOSIS — S62.102D CLOSED FRACTURE OF LEFT WRIST WITH ROUTINE HEALING, SUBSEQUENT ENCOUNTER: ICD-10-CM

## 2024-01-23 DIAGNOSIS — E78.2 MIXED HYPERLIPIDEMIA: ICD-10-CM

## 2024-01-23 PROCEDURE — 99443 PR PHYSICIAN TELEPHONE EVALUATION 21-30 MIN: CPT | Mod: 93 | Performed by: INTERNAL MEDICINE

## 2024-01-23 RX ORDER — LISINOPRIL 10 MG/1
10 TABLET ORAL DAILY
Qty: 30 TABLET | Refills: 1 | Status: SHIPPED | OUTPATIENT
Start: 2024-01-23 | End: 2024-03-18

## 2024-01-23 NOTE — PATIENT INSTRUCTIONS
You need to take the cholesterol medication.    Change your blood pressure medication to lisinopril with hydrochlorothiazide.  You need to have your blood pressure checked and labs in 2 weeks.   We will mail you the order.    You should have a bone density test - schedule that for when you are here with the physical.

## 2024-01-23 NOTE — PROGRESS NOTES
Daria is a 73 year old who is being evaluated via a billable telephone visit.      What phone number would you like to be contacted at? [phone]  How would you like to obtain your AVS? Mail a copy    Distant Location (provider location):  On-site    Assessment & Plan     Essential hypertension, benign  Uncontrolled but better at home than in health care.  Add lisinopril and check labs in [place] and notify of BP in 2 weeks.  - Basic metabolic panel  (Ca, Cl, CO2, Creat, Gluc, K, Na, BUN); Future  - lisinopril (ZESTRIL) 10 MG tablet; Take 1 tablet (10 mg) by mouth daily    Closed fracture of left wrist with routine healing, subsequent encounter  Discussed potential for osteoporosis.  Will get dexa scan when in [place]  - DX Hip/Pelvis/Spine; Future    Postmenopausal    - DX Hip/Pelvis/Spine; Future    Mixed hyperlipidemia  Discussed import of statin.    The longitudinal plan of care for   Essential hypertension, benign  (primary encounter diagnosis)  Mixed hyperlipidemia    was addressed during this visit. Due to the added complexity in care, I will continue to support Daria in the subsequent management of this condition(s) and with the ongoing continuity of care of this condition(s).     I spent a total of 38 minutes on the day of the visit.   Time spent by me doing chart review, history and exam, documentation and further activities per the note      See Patient Instructions    Subjective   Daria is a 73 year old, presenting for the following health issues:  Hypertension (F/U)      1/23/2024     3:12 PM   Additional Questions   Roomed by Cielo Wallace   Accompanied by N/A         1/23/2024     3:12 PM   Patient Reported Additional Medications   Patient reports taking the following new medications No     History of Present Illness       Hypertension: She presents for follow up of hypertension.  She does check blood pressure  regularly outside of the clinic. Outside blood pressures have been over 140/90. She does not  follow a low salt diet.     She eats 2-3 servings of fruits and vegetables daily.She consumes 0 sweetened beverage(s) daily.She exercises with enough effort to increase her heart rate 9 or less minutes per day.  She exercises with enough effort to increase her heart rate 3 or less days per week.   She is taking medications regularly.   Hypertension - Has been high at [facility].  This morning was 147/76 with pulse 60 - sometimes is better at home.  Is better in the morning.  Broke wrist after slip and fall on ice - has been healing ok with cast change.  Last dexa normal 2017 - is on HRT.  Hasn't been eating as well as she can't cook.  Got constipated with emesis.      Hyperlipidemia - was taking rosuvastatin but when broke wrist was worried so stopped.    The 10-year ASCVD risk score (Tenisha PORTILLO, et al., 2019) is: 13.7%    Values used to calculate the score:      Age: 73 years      Sex: Female      Is Non- : No      Diabetic: No      Tobacco smoker: No      Systolic Blood Pressure: 124 mmHg      Is BP treated: Yes      HDL Cholesterol: 43 mg/dL      Total Cholesterol: 206 mg/dL               Objective           Vitals:  No vitals were obtained today due to virtual visit.    Physical Exam   General: Alert and no distress //Respiratory: No audible wheeze, cough, or shortness of breath // Psychiatric:  Appropriate affect, tone, and pace of words            Phone call duration: 28 minutes  Signed Electronically by: Shena Alejo MD

## 2024-01-23 NOTE — TELEPHONE ENCOUNTER
Pt calling to request refill on her Prempro. Informed pt that she has refills on file at the pharmacy (see rx below)        Disp Refills Start End YENIFER    estrogen conj-medroxyPROGESTERone (PREMPRO) 0.3-1.5 MG tablet 84 tablet 3 9/8/2023 -- No   Sig - Route: Take 1 tablet by mouth daily - Oral   Sent to pharmacy as: Prempro 0.3-1.5 MG Oral Tablet (estrogen conj-medroxyPROGESTERone)   Class: E-Prescribe   Order: [id number]   E-Prescribing Status: Receipt confirmed by pharmacy (9/8/2023  8:26 AM CDT)       Advised pt to contact pharmacy to have them fill rx. Patient was given an opportunity to ask questions, verbalized understanding of plan, and is agreeable.    Michelle GLEASON RN

## 2024-01-23 NOTE — TELEPHONE ENCOUNTER
Pt calling back to inform that she has difficulty doing video calls and is requesting visit on 1/23/24 be changed to telephone visit. Changed appointment to telephone visit as requested.    Michelle GLEASON RN

## 2024-01-23 NOTE — TELEPHONE ENCOUNTER
Received call from patient regarding concerns over elevated BP recently.     Patient broke wrist 12/18/23 when she slipped on ice. Went into ER, BP was elevated [185/63]. Patient was referred to orthopedic clinic. Patient is concerned because every time she has been in to see ortho, her BP has been elevated. Per patient, she was told to ask her PCP if her triamterene or clonidine should be increased. Patient otherwise feels ok, no symptoms.     BP Readings from Last 3 Encounters:   09/08/23 124/72   06/08/22 (!) 142/70   05/06/22 (!) 154/92     ED visit notes: 1/20/24 BP = 183/85 pulse 66  12/23/23 BP = 171/67 pulse 65; 182/65 pulse 68    Per patient home BP readings:   1/21/24 BP = 165/74 pulse 55  Evening 1/21/24 BP = 137/73 pulse 60  1/22/24 BP = 146/66 pulse 61  1/23/24 BP = 147/76 pulse 60    RN scheduled patient for VV w/ extender 1/23/24 to discuss BP further. Of note, patient [place] 370 miles away from clinic. Routing to PCP if any alternative recommendation.     Mike AL RN 1/23/2024 at 8:20 AM

## 2024-01-23 NOTE — COMMUNITY RESOURCES LIST (ENGLISH)
01/23/2024   [facility]  N/A  For questions about this resource list or additional care needs, please contact your primary care clinic or care manager.  Phone: [phone]   Email: N/A   Address: [address] [place] [id number]   Hours: N/A        Food and Nutrition       Food pantry  1  [facility] [place] Distance: 1.69 miles      In-Person   [address] [place] [address]  Language: English  Hours: Mon - Fri 10:00 AM - 4:00 PM  Fees: Free   Phone: (662) 596-1068 Email: info@[employer] Website: https://www.[email or web address].org/     2  [employer] [facility] Food Shelf Distance: 20.52 miles      In-Person   [address] [place] [address]  Language: English  Hours: Mon 1:00 PM - 4:00 PM , Wed 1:00 PM - 4:00 PM  Fees: Free   Phone: (898) 191-9606 Email: Click[employer]Sharealexandru@[employer].nl Website: https://www.[employer].org/     Soup kitchen or free meals  3  [facility] [place] Distance: 1.69 miles      In-Person   [address] [place] [address]  Language: English  Hours: Mon - Fri 12:00 PM - 1:00 PM , Mon - Fri 5:00 PM - 6:00 PM  Fees: Free   Phone: (112) 405-7793 Email: info@[employer] Website: https://www.[employer].org/     4  [employer] [facility] [place] Distance: 20.52 miles      In-Person   [address] [place] [address]  Language: English  Hours: Mon - Sun 12:00 PM - 1:00 PM , Mon - Sun 6:00 PM - 7:00 PM  Fees: Free   Phone: (410) 955-1907 Email: jose@[employer].nl Website: https://www.[employer].org/          Important Numbers & Websites       Emergency Services   911  City Services   311  Poison Control   (374) 193-1633  Suicide Prevention Lifeline   (367) 655-1039 (TALK)  Child Abuse Hotline   (621) 321-5074 (4-A-Child)  Sexual Assault Hotline   (900) 585-7489 (HOPE)  National Runaway Safeline   (888) 561-9497 (RUNAWAY)  All-Options Talkline   (239)  438-6743  Substance Abuse Referral   (789) 117-3993 (HELP)

## 2024-01-25 ENCOUNTER — TELEPHONE (OUTPATIENT)
Dept: PEDIATRICS | Facility: CLINIC | Age: 74
End: 2024-01-25
Payer: COMMERCIAL

## 2024-01-25 NOTE — TELEPHONE ENCOUNTER
Annual  Order is signed.  Patient will be due for BMP, lipid.  Provider - is there anything not listed in HM that patient will need for labs?  Aviva Gray, CMA

## 2024-01-25 NOTE — TELEPHONE ENCOUNTER
Order/Referral Request    Who is requesting: patient     Orders being requested: annual lab work     Reason service is needed/diagnosis: annual physical     When are orders needed by: 09/17/2024    Has this been discussed with Provider: Yes    Does patient have a preference on a Group/Provider/Facility? [facility]     Does patient have an appointment scheduled?: Yes: 09/17/2024    Where to send orders: Place orders within Epic    Okay to leave a detailed message?: Yes at Home number on file [phone] (home)

## 2024-01-31 NOTE — TELEPHONE ENCOUNTER
Printed lab order for BMP ordered by Dr. Alejo.    Placed in mail to patient.    Thank you  Jalil MOLINA

## 2024-01-31 NOTE — TELEPHONE ENCOUNTER
MA/TC:  Pt calling to request that her lab orders for recent physical appt be sent to her via mail. Pt states she will go to her local clinic lab, who will then fax us the results. Pt states she has our fax number and clinic number. Pt requests the orders be sent to: [address] [facility] [address].    Please assist in sending orders to patient. Thank you!  Cameron Arellano RN on 1/31/2024 at 8:43 AM

## 2024-02-05 ENCOUNTER — TELEPHONE (OUTPATIENT)
Dept: PEDIATRICS | Facility: CLINIC | Age: 74
End: 2024-02-05
Payer: COMMERCIAL

## 2024-02-05 NOTE — TELEPHONE ENCOUNTER
Patient is supposed to be getting labs done near home.  I don't see results in care-everywhere yet.  Please remind her to get them drawn

## 2024-02-05 NOTE — TELEPHONE ENCOUNTER
EMELIAM for patient. Asked to callback to confirm she received via mail, and when she will complete the labwork.    Thank you  Jalil MOLINA

## 2024-02-12 NOTE — TELEPHONE ENCOUNTER
Outgoing call spoke to patient.     States she had labs drawn last week.    Refreshed care everywhere, labs are in now. BMP.    Thank you  Jalil MOLINA

## 2024-02-12 NOTE — TELEPHONE ENCOUNTER
Outgoing call to patient, no VM and no answer.    Will have to try to call patient later.    Thank you  Jalil MOLINA

## 2024-02-15 DIAGNOSIS — I10 ESSENTIAL HYPERTENSION, BENIGN: ICD-10-CM

## 2024-02-15 RX ORDER — LISINOPRIL 10 MG/1
10 TABLET ORAL DAILY
Qty: 90 TABLET | Refills: 1 | OUTPATIENT
Start: 2024-02-15

## 2024-02-15 NOTE — TELEPHONE ENCOUNTER
Outgoing call spoke to patient. Obtained some recent BP readings.    2/15/24 at 6:00am - 127/68, HR 67    2/14/24 at 5:40am - 122/69, HR 66  2/14/24 at 9:20pm - 137/99, HR 71    2/13/24 at 6:00am - 133/73, HR 67  2/13/24 at 10:00pm - 131/66, HR 73    2/12/24 at 6:10am - 123/73, HR 68  2/12/24 at 10:00pm - 136/67, HR 67    2/11/24 at 8:00am - 137/68, HR 63  2/11/24 at 8:00pm - 133/70, HR 64    2/10/24 at 7:45am - 117/66, HR 65  2/10/24 at 10:00pm - 131/63, HR 65

## 2024-02-15 NOTE — TELEPHONE ENCOUNTER
Please let patient know that BP looks great and should continue with these medications and let me know when she needs refills.

## 2024-02-15 NOTE — TELEPHONE ENCOUNTER
Pt notified of message from Dr. Alejo. Pt verbalizes understanding and no further questions at this time.    [place] RN on 2/15/2024 at 10:47 AM

## 2024-03-17 DIAGNOSIS — I10 ESSENTIAL HYPERTENSION, BENIGN: ICD-10-CM

## 2024-03-18 RX ORDER — LISINOPRIL 10 MG/1
10 TABLET ORAL DAILY
Qty: 90 TABLET | Refills: 1 | Status: SHIPPED | OUTPATIENT
Start: 2024-03-18 | End: 2024-04-04

## 2024-04-04 ENCOUNTER — NURSE TRIAGE (OUTPATIENT)
Dept: PEDIATRICS | Facility: CLINIC | Age: 74
End: 2024-04-04
Payer: COMMERCIAL

## 2024-04-04 DIAGNOSIS — I10 ESSENTIAL HYPERTENSION, BENIGN: ICD-10-CM

## 2024-04-04 RX ORDER — LOSARTAN POTASSIUM 25 MG/1
25 TABLET ORAL DAILY
Qty: 30 TABLET | Refills: 0 | Status: SHIPPED | OUTPATIENT
Start: 2024-04-04 | End: 2024-04-17

## 2024-04-04 NOTE — TELEPHONE ENCOUNTER
RN called and spoke with patient. Relayed provider message. Patient was given an opportunity to ask questions, verbalized understanding of plan, and is agreeable.        MA/TC- Please assist with sending order for BMP to patient.     She will get them drawn at [facility] [[email or web address] on [facility] in [facility].       Her address is:  [address] [place] [id number]    Ana HUNT RN on 4/4/2024 at 10:41 AM

## 2024-04-04 NOTE — TELEPHONE ENCOUNTER
Pt calls,     ROUTED to PCP, pt would like to change Lisinopril to Losartan, see note, INFORM pt when final    Nurse Triage SBAR    Is this a 2nd Level Triage? NO    Situation: developed dry annoying cough after starting Lisinopril    Background: informs coughs several times throughout the day and night, sometimes wakes her up, denies cough or cold symptoms, informs  had same issue and was changed to Losartan, pt wants blood pressure medication change to Losartan    Assessment: medication request    Recommendation: routed to PCP, please advise, route to inform pt, pt aware PCP out today and okay for tomorrow, may leave message on voice mail     Routed to provider    Does the patient meet one of the following criteria for ADS visit consideration? 16+ years old, with an [facility] PCP     TIP  Providers, please consider if this condition is appropriate for management at one of our Acute and Diagnostic Services sites.     If patient is a good candidate, please use dotphrase <dot>triageresponse and select Refer to ADS to document.        Reason for Disposition   Caller has NON-URGENT medicine question about med that PCP or specialist prescribed and triager unable to answer question    Additional Information   Negative: Intentional drug overdose and suicidal thoughts or ideas   Negative: Drug overdose and triager unable to answer question   Negative: Caller requesting a renewal or refill of a medicine patient is currently taking   Negative: Caller requesting information unrelated to medicine   Negative: Caller requesting information about COVID-19 Vaccine   Negative: Caller requesting information about Emergency Contraception   Negative: Caller requesting information about Combined Birth Control Pills   Negative: Caller requesting information about Progestin Birth Control Pills   Negative: Caller requesting information about Post-Op pain or medicines   Negative: Caller requesting a prescription antibiotic (such as  penicillin) for Strep throat and has a positive culture result   Negative: Caller requesting a prescription anti-viral med (such as Tamiflu) and has influenza (flu) symptoms   Negative: Immunization reaction suspected   Negative: Rash while taking a medicine or within 3 days of stopping it   Negative: Asthma and having symptoms of asthma (cough, wheezing, etc.)   Negative: Symptom of illness (e.g., headache, abdominal pain, earache, vomiting) that are more than mild   Negative: Breastfeeding questions about mother's medicines and diet   Negative: MORE THAN A DOUBLE DOSE of a prescription or over-the-counter (OTC) drug   Negative: DOUBLE DOSE (an extra dose or lesser amount) of prescription drug and any symptoms (e.g., dizziness, nausea, pain, sleepiness)   Negative: DOUBLE DOSE (an extra dose or lesser amount) of over-the-counter (OTC) drug and any symptoms (e.g., dizziness, nausea, pain, sleepiness)   Negative: Took another person's prescription drug   Negative: DOUBLE DOSE (an extra dose or lesser amount) of prescription drug and NO symptoms  (Exception: A double dose of antibiotics.)   Negative: Diabetes drug error or overdose (e.g., took wrong type of insulin or took extra dose)   Negative: Caller has medication question about med NOT prescribed by PCP and triager unable to answer question (e.g., compatibility with other med, storage)   Negative: Prescription not at pharmacy and was prescribed by PCP recently  (Exception: triager has access to EMR and prescription is recorded there. Go to Home Care and confirm for pharmacy.)   Negative: Pharmacy calling with prescription question and triager unable to answer question   Negative: Caller has URGENT medicine question about med that PCP or specialist prescribed and triager unable to answer question   Negative: Caller wants to use a complementary or alternative medicine   Negative: Medicine patch causing local rash or itching    Answer Assessment - Initial Assessment  "Questions  1. NAME of MEDICINE: \"What medicine(s) are you calling about?\"      Lisinopril   2. QUESTION: \"What is your question?\" (e.g., double dose of medicine, side effect)      Cough started after starting lisinopril, started in January, dry, annoying cought  3. PRESCRIBER: \"Who prescribed the medicine?\" Reason: if prescribed by specialist, call should be referred to that group.      Dr Alejo  4. SYMPTOMS: \"Do you have any symptoms?\" If Yes, ask: \"What symptoms are you having?\"  \"How bad are the symptoms (e.g., mild, moderate, severe)      Cough getting more frequent, interferes with adl's, wakes up at night, dry cough  5. PREGNANCY:  \"Is there any chance that you are pregnant?\" \"When was your last menstrual period?\"      N/a    Protocols used: Medication Question Call-A-[place]    Lu Villareal RN, BSN  [facility]    "

## 2024-04-04 NOTE — TELEPHONE ENCOUNTER
New prescription sent.  Should get labs checked in 2 weeks (please send her the order to get drawn up there) and she should let us know what her BP is at that time to make sure dose is correct.

## 2024-04-17 DIAGNOSIS — I10 ESSENTIAL HYPERTENSION, BENIGN: ICD-10-CM

## 2024-04-17 RX ORDER — LOSARTAN POTASSIUM 25 MG/1
25 TABLET ORAL DAILY
Qty: 90 TABLET | Refills: 0 | Status: SHIPPED | OUTPATIENT
Start: 2024-04-17 | End: 2024-07-09

## 2024-04-25 ENCOUNTER — TELEPHONE (OUTPATIENT)
Dept: PEDIATRICS | Facility: CLINIC | Age: 74
End: 2024-04-25
Payer: COMMERCIAL

## 2024-04-25 NOTE — TELEPHONE ENCOUNTER
Patient calling with BP update since starting losartan.  Started the losartan on 4/5/24.  States she had a cough from lisinopril.     Home BP readings  4/18/24 5:34 am 105/65 P- 62 8:20 pm 130/69 P- 61  4/19/24 6 am 101/60 P- 57 5:35 pm 120/66 P- 65  4/20/24 6:40 am 114/69 P- 67 6 pm 121/65 P- 65  4/21/24 7:55 am 121/68 P- 59 10:36 pm 128/68 P- 59  4/22/24 6:10 am 124/66 P- 65 10:10 pm 129/68 P- 56  4/23/24 6:30 am 116/72 P- 67 5:55 pm 128/71 P- 62  4/24/24 6:10 am 125/70 P- 62 7:15 pm 127/70 P-62  4/25/24 6 am 128/70 P- 67    Denies having any side effects or any concerns with losartan.  States she feels it is working better than the lisinopril did for her.    Gale Law RN  This encounter was handled by a team outside your facility.  If action needs to be taken, please route the encounter back to your team at your own clinic, not the sender.  Thank you

## 2024-07-09 DIAGNOSIS — E78.2 MIXED HYPERLIPIDEMIA: ICD-10-CM

## 2024-07-09 DIAGNOSIS — I10 ESSENTIAL HYPERTENSION, BENIGN: ICD-10-CM

## 2024-07-09 DIAGNOSIS — N95.1 SYMPTOMATIC MENOPAUSAL OR FEMALE CLIMACTERIC STATES: ICD-10-CM

## 2024-07-09 RX ORDER — LOSARTAN POTASSIUM 25 MG/1
25 TABLET ORAL DAILY
Qty: 90 TABLET | Refills: 0 | Status: SHIPPED | OUTPATIENT
Start: 2024-07-09 | End: 2024-09-17

## 2024-07-09 RX ORDER — CLONIDINE HYDROCHLORIDE 0.1 MG/1
0.1 TABLET ORAL AT BEDTIME
Qty: 90 TABLET | Refills: 3 | OUTPATIENT
Start: 2024-07-09

## 2024-07-09 RX ORDER — ROSUVASTATIN CALCIUM 5 MG/1
5 TABLET, COATED ORAL DAILY
Qty: 90 TABLET | Refills: 3 | OUTPATIENT
Start: 2024-07-09

## 2024-07-09 RX ORDER — TRIAMTERENE AND HYDROCHLOROTHIAZIDE 37.5; 25 MG/1; MG/1
1 CAPSULE ORAL EVERY MORNING
Qty: 90 CAPSULE | Refills: 3 | OUTPATIENT
Start: 2024-07-09

## 2024-08-17 ENCOUNTER — TRANSFERRED RECORDS (OUTPATIENT)
Dept: HEALTH INFORMATION MANAGEMENT | Facility: CLINIC | Age: 74
End: 2024-08-17
Payer: COMMERCIAL

## 2024-08-30 DIAGNOSIS — E78.2 MIXED HYPERLIPIDEMIA: ICD-10-CM

## 2024-08-30 RX ORDER — ROSUVASTATIN CALCIUM 5 MG/1
5 TABLET, COATED ORAL DAILY
Qty: 90 TABLET | Refills: 0 | Status: SHIPPED | OUTPATIENT
Start: 2024-08-30 | End: 2024-09-17

## 2024-09-17 ENCOUNTER — ANCILLARY PROCEDURE (OUTPATIENT)
Dept: BONE DENSITY | Facility: CLINIC | Age: 74
End: 2024-09-17
Attending: INTERNAL MEDICINE
Payer: COMMERCIAL

## 2024-09-17 ENCOUNTER — LAB (OUTPATIENT)
Dept: LAB | Facility: CLINIC | Age: 74
End: 2024-09-17
Payer: COMMERCIAL

## 2024-09-17 ENCOUNTER — ANCILLARY PROCEDURE (OUTPATIENT)
Dept: MAMMOGRAPHY | Facility: CLINIC | Age: 74
End: 2024-09-17
Attending: INTERNAL MEDICINE
Payer: COMMERCIAL

## 2024-09-17 ENCOUNTER — OFFICE VISIT (OUTPATIENT)
Dept: PEDIATRICS | Facility: CLINIC | Age: 74
End: 2024-09-17
Attending: INTERNAL MEDICINE
Payer: COMMERCIAL

## 2024-09-17 VITALS
BODY MASS INDEX: 31.93 KG/M2 | DIASTOLIC BLOOD PRESSURE: 68 MMHG | OXYGEN SATURATION: 96 % | TEMPERATURE: 98 F | HEIGHT: 62 IN | WEIGHT: 173.5 LBS | HEART RATE: 69 BPM | SYSTOLIC BLOOD PRESSURE: 136 MMHG | RESPIRATION RATE: 18 BRPM

## 2024-09-17 DIAGNOSIS — S62.102D CLOSED FRACTURE OF LEFT WRIST WITH ROUTINE HEALING, SUBSEQUENT ENCOUNTER: ICD-10-CM

## 2024-09-17 DIAGNOSIS — N95.1 SYMPTOMATIC MENOPAUSAL OR FEMALE CLIMACTERIC STATES: ICD-10-CM

## 2024-09-17 DIAGNOSIS — Z12.11 COLON CANCER SCREENING: ICD-10-CM

## 2024-09-17 DIAGNOSIS — E78.2 MIXED HYPERLIPIDEMIA: ICD-10-CM

## 2024-09-17 DIAGNOSIS — Z78.0 POSTMENOPAUSAL: ICD-10-CM

## 2024-09-17 DIAGNOSIS — I10 ESSENTIAL HYPERTENSION, BENIGN: ICD-10-CM

## 2024-09-17 DIAGNOSIS — Z00.00 ENCOUNTER FOR MEDICARE ANNUAL WELLNESS EXAM: Primary | ICD-10-CM

## 2024-09-17 DIAGNOSIS — Z78.0 ASYMPTOMATIC POSTMENOPAUSAL STATE: ICD-10-CM

## 2024-09-17 DIAGNOSIS — R73.01 IMPAIRED FASTING GLUCOSE: ICD-10-CM

## 2024-09-17 DIAGNOSIS — E78.2 MIXED HYPERLIPIDEMIA: Primary | ICD-10-CM

## 2024-09-17 DIAGNOSIS — Z12.31 VISIT FOR SCREENING MAMMOGRAM: ICD-10-CM

## 2024-09-17 DIAGNOSIS — T14.8XXA NONHEALING NONSURGICAL WOUND LIMITED TO BREAKDOWN OF SKIN: ICD-10-CM

## 2024-09-17 PROBLEM — Z71.89 ACP (ADVANCE CARE PLANNING): Chronic | Status: RESOLVED | Noted: 2017-11-14 | Resolved: 2024-09-17

## 2024-09-17 LAB
ANION GAP SERPL CALCULATED.3IONS-SCNC: 12 MMOL/L (ref 7–15)
BUN SERPL-MCNC: 15.1 MG/DL (ref 8–23)
CALCIUM SERPL-MCNC: 9.1 MG/DL (ref 8.8–10.4)
CHLORIDE SERPL-SCNC: 102 MMOL/L (ref 98–107)
CHOLEST SERPL-MCNC: 137 MG/DL
CREAT SERPL-MCNC: 0.85 MG/DL (ref 0.51–0.95)
EGFRCR SERPLBLD CKD-EPI 2021: 72 ML/MIN/1.73M2
FASTING STATUS PATIENT QL REPORTED: YES
FASTING STATUS PATIENT QL REPORTED: YES
GLUCOSE SERPL-MCNC: 112 MG/DL (ref 70–99)
HCO3 SERPL-SCNC: 24 MMOL/L (ref 22–29)
HDLC SERPL-MCNC: 38 MG/DL
LDLC SERPL CALC-MCNC: 71 MG/DL
NONHDLC SERPL-MCNC: 99 MG/DL
POTASSIUM SERPL-SCNC: 4.1 MMOL/L (ref 3.4–5.3)
SODIUM SERPL-SCNC: 138 MMOL/L (ref 135–145)
TRIGL SERPL-MCNC: 138 MG/DL

## 2024-09-17 PROCEDURE — 80048 BASIC METABOLIC PNL TOTAL CA: CPT

## 2024-09-17 PROCEDURE — 77067 SCR MAMMO BI INCL CAD: CPT | Mod: TC | Performed by: RADIOLOGY

## 2024-09-17 PROCEDURE — 80061 LIPID PANEL: CPT

## 2024-09-17 PROCEDURE — 77080 DXA BONE DENSITY AXIAL: CPT | Mod: TC | Performed by: PHYSICIAN ASSISTANT

## 2024-09-17 PROCEDURE — 99213 OFFICE O/P EST LOW 20 MIN: CPT | Mod: 25 | Performed by: INTERNAL MEDICINE

## 2024-09-17 PROCEDURE — 77063 BREAST TOMOSYNTHESIS BI: CPT | Mod: TC | Performed by: RADIOLOGY

## 2024-09-17 PROCEDURE — 36415 COLL VENOUS BLD VENIPUNCTURE: CPT

## 2024-09-17 PROCEDURE — G0439 PPPS, SUBSEQ VISIT: HCPCS | Performed by: INTERNAL MEDICINE

## 2024-09-17 RX ORDER — ROSUVASTATIN CALCIUM 5 MG/1
5 TABLET, COATED ORAL EVERY OTHER DAY
Qty: 45 TABLET | Refills: 4 | Status: SHIPPED | OUTPATIENT
Start: 2024-09-17

## 2024-09-17 RX ORDER — ESTROGEN,CON/M-PROGEST ACET 0.3-1.5MG
1 TABLET ORAL DAILY
Qty: 84 TABLET | Refills: 4 | Status: SHIPPED | OUTPATIENT
Start: 2024-09-17

## 2024-09-17 RX ORDER — TRIAMTERENE AND HYDROCHLOROTHIAZIDE 37.5; 25 MG/1; MG/1
1 CAPSULE ORAL EVERY MORNING
Qty: 90 CAPSULE | Refills: 4 | Status: SHIPPED | OUTPATIENT
Start: 2024-09-17

## 2024-09-17 RX ORDER — LOSARTAN POTASSIUM 25 MG/1
25 TABLET ORAL DAILY
Qty: 90 TABLET | Refills: 4 | Status: SHIPPED | OUTPATIENT
Start: 2024-09-17

## 2024-09-17 RX ORDER — CLONIDINE HYDROCHLORIDE 0.1 MG/1
0.1 TABLET ORAL AT BEDTIME
Qty: 90 TABLET | Refills: 4 | Status: SHIPPED | OUTPATIENT
Start: 2024-09-17

## 2024-09-17 ASSESSMENT — PAIN SCALES - GENERAL: PAINLEVEL: NO PAIN (0)

## 2024-09-17 NOTE — PATIENT INSTRUCTIONS
Don't put any neosporin on the eyelid.  You should see ophthalmology: [email or web address]    Do your FIT test in December - you are due December 10th.    Get your flu and covid vaccines this fall.    Patient Education   Preventive Care Advice   This is general advice given by our system to help you stay healthy. However, your care team may have specific advice just for you. Please talk to your care team about your preventive care needs.  Nutrition  Eat 5 or more servings of fruits and vegetables each day.  Try wheat bread, brown rice and whole grain pasta (instead of white bread, rice, and pasta).  Get enough calcium and vitamin D. Check the label on foods and aim for 100% of the RDA (recommended daily allowance).  Lifestyle  Exercise at least 150 minutes each week  (30 minutes a day, 5 days a week).  Do muscle strengthening activities 2 days a week. These help control your weight and prevent disease.  No smoking.  Wear sunscreen to prevent skin cancer.  Have a dental exam and cleaning every 6 months.  Yearly exams  See your health care team every year to talk about:  Any changes in your health.  Any medicines your care team has prescribed.  Preventive care, family planning, and ways to prevent chronic diseases.  Shots (vaccines)   COVID-19 shot: Get this shot when it's due.  Flu shot: Get a flu shot every year.  Tetanus shot: Get a tetanus shot every 10 years - you are due in 2034.    General health tests  Diabetes screening:  Starting at age 35, Get screened for diabetes at least every 3 years.  If you are younger than age 35, ask your care team if you should be screened for diabetes.  Cholesterol test: At age 39, start having a cholesterol test every 5 years, or more often if advised.  Bone density scan (DEXA): At age 50, ask your care team if you should have this scan for osteoporosis (brittle bones).  Hepatitis C: Get tested at least once in your life.  STIs  (sexually transmitted infections)  Before age 24: Ask your care team if you should be screened for STIs.  After age 24: Get screened for STIs if you're at risk. You are at risk for STIs (including HIV) if:  You are sexually active with more than one person.  You don't use condoms every time.  You or a partner was diagnosed with a sexually transmitted infection.  If you are at risk for HIV, ask about PrEP medicine to prevent HIV.  Get tested for HIV at least once in your life, whether you are at risk for HIV or not.  Cancer screening tests  Cervical cancer screening: If you have a cervix, begin getting regular cervical cancer screening tests starting at age 21.  Breast cancer scan (mammogram): If you've ever had breasts, begin having regular mammograms starting at age 40. This is a scan to check for breast cancer.  Colon cancer screening: It is important to start screening for colon cancer at age 45.  Have a colonoscopy test every 10 years (or more often if you're at risk) Or, ask your provider about stool tests like a FIT test every year or Cologuard test every 3 years.  To learn more about your testing options, visit:   .  For help making a decision, visit:   https://bit.ly/es70817.  Prostate cancer screening test: If you have a prostate, ask your care team if a prostate cancer screening test (PSA) at age 55 is right for you.  Lung cancer screening: If you are a current or former smoker ages 50 to 80, ask your care team if ongoing lung cancer screenings are right for you.  For informational purposes only. Not to replace the advice of your health care provider. Copyright   2023 [facility]. All rights reserved. Clinically reviewed by the [facility] Transitions Program. [employer] [id number] - REV 01/24.  Preventing Falls: Care Instructions  Injuries and health problems such as trouble walking or poor eyesight can increase your risk of falling. So can some medicines. But there are things you can do to  "help prevent falls. You can exercise to get stronger. You can also arrange your home to make it safer.    Talk to your doctor about the medicines you take. Ask if any of them increase the risk of falls and whether they can be changed or stopped.   Try to exercise regularly. It can help improve your strength and balance. This can help lower your risk of falling.     Practice fall safety and prevention.    Wear low-heeled shoes that fit well and give your feet good support. Talk to your doctor if you have foot problems that make this hard.  Carry a cellphone or wear a medical alert device that you can use to call for help.  Use stepladders instead of chairs to reach high objects. Don't climb if you're at risk for falls. Ask for help, if needed.  Wear the correct eyeglasses, if you need them.    Make your home safer.    Remove rugs, cords, clutter, and furniture from walkways.  Keep your house well lit. Use night-lights in hallways and bathrooms.  Install and use sturdy handrails on stairways.  Wear nonskid footwear, even inside. Don't walk barefoot or in socks without shoes.    Be safe outside.    Use handrails, curb cuts, and ramps whenever possible.  Keep your hands free by using a shoulder bag or backpack.  Try to walk in well-lit areas. Watch out for uneven ground, changes in pavement, and debris.  Be careful in the winter. Walk on the grass or gravel when sidewalks are slippery. Use de-icer on steps and walkways. Add non-slip devices to shoes.    Put grab bars and nonskid mats in your shower or tub and near the toilet. Try to use a shower chair or bath bench when bathing.   Get into a tub or shower by putting in your weaker leg first. Get out with your strong side first. Have a phone or medical alert device in the bathroom with you.   Where can you learn more?  Go to https://www.[employer].net/patiented  Enter G117 in the search box to learn more about \"Preventing Falls: Care Instructions.\"  Current as of: July " 17, 2023               Content Version: 14.0    2210-2479 [employer].   Care instructions adapted under license by your healthcare professional. If you have questions about a medical condition or this instruction, always ask your healthcare professional. [employer] disclaims any warranty or liability for your use of this information.

## 2024-09-17 NOTE — PROGRESS NOTES
"Preventive Care Visit  [facility] PANKAJ Alejo MD, Internal Medicine  Sep 17, 2024      Assessment & Plan     Encounter for Medicare annual wellness exam  Routine health education discussed: calcium, diet, exercise, weight, safety.     Essential hypertension, benign  Controlled, continue medication and await labs.  Discussed healthy diet and exercise  - cloNIDine (CATAPRES) 0.1 MG tablet; Take 1 tablet (0.1 mg) by mouth at bedtime.  - losartan (COZAAR) 25 MG tablet; Take 1 tablet (25 mg) by mouth daily.  - triamterene-HCTZ (DYAZIDE) 37.5-25 MG capsule; Take 1 capsule by mouth every morning.    Symptomatic menopausal or female climacteric states  Still with some hot flashes so will not taper  - cloNIDine (CATAPRES) 0.1 MG tablet; Take 1 tablet (0.1 mg) by mouth at bedtime.  - estrogen conj-medroxyPROGESTERone (PREMPRO) 0.3-1.5 MG tablet; Take 1 tablet by mouth daily.    Mixed hyperlipidemia  Having side effects so will drop dose  - rosuvastatin (CRESTOR) 5 MG tablet; Take 1 tablet (5 mg) by mouth every other day.    Asymptomatic postmenopausal state  Had dexa this morning    Nonhealing nonsurgical wound limited to breakdown of skin  See ophthalmology at home    Colon cancer screening  Wishes to continue with FIT testing  - Fecal colorectal cancer screen (FIT); Future    Impaired fasting glucose  Discussed glucose elevation.  Discuss this is a pre-diabetic condition.  Recommended eating healthily, exercising and maintaining a healthy weight to prevent the development of diabetes.  Recommended blood sugar checks at least yearly to monitor this.  Recommended dietary consultation which the patient declined.             BMI  Estimated body mass index is 31.84 kg/m  as calculated from the following:    Height as of this encounter: 1.572 m (5' 1.9\").    Weight as of this encounter: 78.7 kg (173 lb 8 oz).   Weight management plan: Discussed healthy diet and exercise " guidelines    Counseling  Appropriate preventive services were addressed with this patient via screening, questionnaire, or discussion as appropriate for fall prevention, nutrition, physical activity, Tobacco-use cessation, social engagement, weight loss and cognition.  Checklist reviewing preventive services available has been given to the patient.  Reviewed patient's diet, addressing concerns and/or questions.       See Patient Instructions    Subjective   Daria is a 74 year old, presenting for the following:  Annual Visit        9/17/2024    10:06 AM   Additional Questions   Roomed by Aviva Gray. CMA         Health Care Directive  Patient does not have a Health Care Directive or Living Will: Patient states has Advance Directive and will bring in a copy to clinic.    HPI  Wants to discuss statin  Look at face - healing from fall.  Put up solar lights in August but then fell when trying to set it up.  Hit edge of border where flowers were and needed stitches.    Cholesterol - getting some leg cramps and tingling from rosuvastatin.  Stopped after broke wrist and noted felt better.    Menopause - still gets hot flashes.  Wakes her up occasionally.  Gets sweaty but manageable.        9/17/2024   General Health   How would you rate your overall physical health? Good   Feel stress (tense, anxious, or unable to sleep) Not at all            9/17/2024   Nutrition   Diet: Regular (no restrictions) - tries to eat whole grains.              9/17/2024   Exercise   Days per week of moderate/strenous exercise 4 days   Average minutes spent exercising at this level 30 min            9/17/2024   Social Factors   Frequency of gathering with friends or relatives More than three times a week   Worry food won't last until get money to buy more No   Food not last or not have enough money for food? No   Do you have housing? (Housing is defined as stable permanent housing and does not include staying ouside in a car, in a tent, in  an abandoned building, in an overnight shelter, or couch-surfing.) Yes   Are you worried about losing your housing? No   Lack of transportation? No   Unable to get utilities (heat,electricity)? No            9/17/2024   Fall Risk   Fallen 2 or more times in the past year? Yes    Yes   Trouble with walking or balance? No    No   Gait Speed Test (Document in seconds) 4.28   Gait Speed Test Interpretation Less than or equal to 5.00 seconds - PASS       Multiple values from one day are sorted in reverse-chronological order          9/17/2024   Activities of Daily Living- Home Safety   Needs help with the following daily activites None of the above   Safety concerns in the home None of the above            9/17/2024   Dental   Dentist two times every year? Yes            9/17/2024   Hearing Screening   Hearing concerns? None of the above            9/17/2024   Driving Risk Screening   Patient/family members have concerns about driving No            9/17/2024   General Alertness/Fatigue Screening   Have you been more tired than usual lately? No            9/17/2024   Urinary Incontinence Screening   Bothered by leaking urine in past 6 months No            9/17/2024   TB Screening   Were you born outside of the [place]? No            Today's PHQ-2 Score:       9/17/2024     9:53 AM   PHQ-2 ( 1999 Pf[employer])   Q1: Little interest or pleasure in doing things 0   Q2: Feeling down, depressed or hopeless 0   PHQ-2 Score 0   Q1: Little interest or pleasure in doing things Not at all   Q2: Feeling down, depressed or hopeless Not at all   PHQ-2 Score 0           9/17/2024   Substance Use   Alcohol more than 3/day or more than 7/wk No   Do you have a current opioid prescription? No   How severe/bad is pain from 1 to 10? 0/10 (No Pain)   Do you use any other substances recreationally? No        Social History     Tobacco Use    Smoking status: Never    Smokeless tobacco: Never   Vaping Use    Vaping status: Never Used   Substance Use Topics     Alcohol use: No    Drug use: No           9/17/2024   LAST FHS-7 RESULTS   1st degree relative breast or ovarian cancer Yes           Mammogram Screening - Mammogram every 1-2 years updated in Health Maintenance based on mutual decision making    ASCVD Risk   The 10-year ASCVD risk score (Tenisha PORTILLO, et al., 2019) is: 17.9%    Values used to calculate the score:      Age: 74 years      Sex: Female      Is Non- : No      Diabetic: No      Tobacco smoker: No      Systolic Blood Pressure: 136 mmHg      Is BP treated: Yes      HDL Cholesterol: 43 mg/dL      Total Cholesterol: 206 mg/dL            Reviewed and updated as needed this visit by Provider   Tobacco  Allergies  Meds  Problems  Med Hx  Surg Hx  Fam Hx     Sexual Activity          Labs reviewed in EPIC  Current providers sharing in care for this patient include:  Patient Care Team:  Shena Alejo MD as PCP - General  Shena Alejo MD as Assigned PCP    The following health maintenance items are reviewed in Epic and correct as of today:  Health Maintenance   Topic Date Due    INFLUENZA VACCINE (1) 09/01/2024    COVID-19 Vaccine (6 - 2024-25 season) 09/01/2024    BMP  09/08/2024    LIPID  09/08/2024    MAMMO SCREENING  09/08/2024    COLORECTAL CANCER SCREENING  12/10/2024    ANNUAL REVIEW OF HM ORDERS  01/25/2025    MEDICARE ANNUAL WELLNESS VISIT  09/17/2025    FALL RISK ASSESSMENT  09/17/2025    GLUCOSE  09/08/2026    RSV VACCINE (1 - 1-dose 75+ series) 03/02/2027    ADVANCE CARE PLANNING  09/17/2029    DEXA  05/19/2032    DTAP/TDAP/TD IMMUNIZATION (4 - Td or Tdap) 08/17/2034    HEPATITIS C SCREENING  Completed    PHQ-2 (once per calendar year)  Completed    Pneumococcal Vaccine: 65+ Years  Completed    ZOSTER IMMUNIZATION  Completed    HPV IMMUNIZATION  Aged Out    MENINGITIS IMMUNIZATION  Aged Out    RSV MONOCLONAL ANTIBODY  Aged Out            Objective    Exam  /68 (BP Location: Right arm, Cuff Size: Adult  "Regular)   Pulse 69   Temp 98  F (36.7  C) (Tympanic)   Resp 18   Ht 1.572 m (5' 1.9\")   Wt 78.7 kg (173 lb 8 oz)   LMP  (LMP Unknown)   SpO2 96%   BMI 31.84 kg/m     Estimated body mass index is 31.84 kg/m  as calculated from the following:    Height as of this encounter: 1.572 m (5' 1.9\").    Weight as of this encounter: 78.7 kg (173 lb 8 oz).    Physical Exam  GENERAL: alert and no distress  EYES: Eyes grossly normal to inspection, PERRL and conjunctivae and sclerae normal  HENT: ear canals and TM's normal, nose and mouth without ulcers or lesions  NECK: no adenopathy, no asymmetry, masses, or scars  RESP: lungs clear to auscultation - no rales, rhonchi or wheezes  CV: regular rate and rhythm, normal S1 S2, no S3 or S4, no murmur, click or rub, no peripheral edema  ABDOMEN: soft, nontender, no hepatosplenomegaly, no masses and bowel sounds normal  MS: no gross musculoskeletal defects noted, no edema  SKIN: no suspicious lesions or rashes  NEURO: Normal strength and tone, mentation intact and speech normal  PSYCH: mentation appears normal, affect normal/bright        9/17/2024   Mini Cog   Clock Draw Score 2 Normal   3 Item Recall 3 objects recalled   Mini Cog Total Score 5                 Signed Electronically by: Shena Alejo MD    "

## 2024-09-18 ENCOUNTER — TELEPHONE (OUTPATIENT)
Dept: PEDIATRICS | Facility: CLINIC | Age: 74
End: 2024-09-18
Payer: COMMERCIAL

## 2024-09-18 DIAGNOSIS — T14.8XXA NONHEALING NONSURGICAL WOUND LIMITED TO BREAKDOWN OF SKIN: Primary | ICD-10-CM

## 2024-09-18 NOTE — TELEPHONE ENCOUNTER
Order/Referral Request    Who is requesting: Pt    Orders being requested: Eye Referral    Reason service is needed/diagnosis: Eye Injury    When are orders needed by: ASAP    Has this been discussed with Provider: Yes    Does patient have a preference on a Group/Provider/Facility? [facility]    Does patient have an appointment scheduled?: No    Where to send orders: Fax - doesn't have it    Could we [place] this [place] to you [place] or would you prefer to receive a phone call?:   Patient would prefer a phone call   Okay to leave a detailed message?: Yes at Cell number on file:    Telephone Information:   Mobile [phone]

## 2024-09-19 ENCOUNTER — MYC MEDICAL ADVICE (OUTPATIENT)
Dept: PEDIATRICS | Facility: CLINIC | Age: 74
End: 2024-09-19
Payer: COMMERCIAL

## 2024-09-19 NOTE — TELEPHONE ENCOUNTER
"Incoming call from patient. Requests referral to [facility] in [place] (fax [phone]).     Per chart review, discussed at visit with Shena Alejo MD 9/17/24:  \"Nonhealing nonsurgical wound limited to breakdown of skin  See ophthalmology at home\"    Patient reports it is slowly getting better, but is still there.     Please let patient know when/if this has been faxed.    Keisha Sorto RN    "

## 2024-09-19 NOTE — TELEPHONE ENCOUNTER
Referral printed and faxed to [facility] @ [phone].  Called patient and updated.      Mariluz GEIGER, - [facility]  Primary Care- Raj[facility]

## 2024-09-19 NOTE — TELEPHONE ENCOUNTER
Left message for patient to call back as well as sent [facility] message.    When patient call back:  Please triage eye injury. Please inquire if patient checked insurance and actually needs a referral. If not, advise to check. If they need a referral, advise e-visit after triaging.    Thanks!  Tatiana HAMILTON RN, BSN  Clinic RN  [facility]

## 2024-10-02 ENCOUNTER — TELEPHONE (OUTPATIENT)
Dept: PEDIATRICS | Facility: CLINIC | Age: 74
End: 2024-10-02
Payer: COMMERCIAL

## 2024-10-02 NOTE — TELEPHONE ENCOUNTER
FYI - Status Update    Who is Calling: patient    Update:  States she received her covid vaccination on 10/2/2024    Does caller want a call/response back: No

## 2024-10-02 NOTE — TELEPHONE ENCOUNTER
1st attempt: Sent pt a [employer] message requesting more information regarding her vaccine prior to updating.     Will wait for response.     Felicia Culp MA 3:04 PM 10/2/2024

## 2024-11-07 ENCOUNTER — TELEPHONE (OUTPATIENT)
Dept: PEDIATRICS | Facility: CLINIC | Age: 74
End: 2024-11-07
Payer: COMMERCIAL

## 2024-11-07 NOTE — TELEPHONE ENCOUNTER
Patient calling with update for provider after last office visit. Patient did see eye doctors as recommended by PCP. Has been using cortisone cream as directed by eye doctor and wound is healing nicely. Expressed appreciation of care and advice received by PCP.    No needs at this time.    Gris MOHAMUD RN  11/7/2024 at 11:25 AM  [facility]

## 2025-01-03 ENCOUNTER — LAB (OUTPATIENT)
Dept: LAB | Facility: CLINIC | Age: 75
End: 2025-01-03
Payer: COMMERCIAL

## 2025-01-03 PROCEDURE — 82274 ASSAY TEST FOR BLOOD FECAL: CPT | Performed by: INTERNAL MEDICINE

## 2025-01-06 DIAGNOSIS — N95.1 SYMPTOMATIC MENOPAUSAL OR FEMALE CLIMACTERIC STATES: ICD-10-CM

## 2025-01-06 LAB — HEMOCCULT STL QL IA: NEGATIVE

## 2025-01-06 RX ORDER — ESTROGEN,CON/M-PROGEST ACET 0.3-1.5MG
1 TABLET ORAL DAILY
Qty: 84 TABLET | Refills: 3 | OUTPATIENT
Start: 2025-01-06

## 2025-01-08 ENCOUNTER — TELEPHONE (OUTPATIENT)
Dept: PEDIATRICS | Facility: CLINIC | Age: 75
End: 2025-01-08
Payer: COMMERCIAL

## 2025-01-08 NOTE — TELEPHONE ENCOUNTER
Called pt back. Pt says that she received an email with her FIT test result which she couldn't open. Notified pt that her recent FIT test is negative. Pt appreciated the call back. No other questions or concerns from pt at this time.    Component      Latest Ref Rng 1/3/2025  7:00 AM   Occult Blood Scn FIT      Negative  Negative      Keara Doan RN  [facility]

## 2025-01-08 NOTE — TELEPHONE ENCOUNTER
Test Results        Who ordered the test:  PCP    Type of test: Lab and Lab Mail in    Date of test:  01/03/2025    Where was the test performed:  N/A    What are your questions/concerns?:  She would like to know the results. She received an email that she is unable to open.    Could we send this information to you in [employer] or would you prefer to receive a phone call?:   Patient would prefer a phone call   Okay to leave a detailed message?: N/A at Cell number on file:  She would prefer a letter in the mail.   Telephone Information:   Mobile [phone]

## 2025-03-29 ENCOUNTER — HEALTH MAINTENANCE LETTER (OUTPATIENT)
Age: 75
End: 2025-03-29

## 2025-08-18 ENCOUNTER — PATIENT OUTREACH (OUTPATIENT)
Dept: CARE COORDINATION | Facility: CLINIC | Age: 75
End: 2025-08-18
Payer: COMMERCIAL